# Patient Record
Sex: MALE | Race: WHITE | NOT HISPANIC OR LATINO | URBAN - METROPOLITAN AREA
[De-identification: names, ages, dates, MRNs, and addresses within clinical notes are randomized per-mention and may not be internally consistent; named-entity substitution may affect disease eponyms.]

---

## 2023-03-10 PROCEDURE — 88305 TISSUE EXAM BY PATHOLOGIST: CPT

## 2023-03-13 ENCOUNTER — LAB REQUISITION (OUTPATIENT)
Dept: LAB | Facility: HOSPITAL | Age: 63
End: 2023-03-13
Payer: COMMERCIAL

## 2023-03-13 DIAGNOSIS — M72.0 PALMAR FASCIAL FIBROMATOSIS (DUPUYTREN): ICD-10-CM

## 2023-03-14 LAB — REF LAB TEST METHOD: NORMAL

## 2023-04-11 ENCOUNTER — HOSPITAL ENCOUNTER (INPATIENT)
Facility: HOSPITAL | Age: 63
LOS: 2 days | Discharge: HOME | DRG: 246 | End: 2023-04-13
Attending: INTERNAL MEDICINE | Admitting: INTERNAL MEDICINE
Payer: COMMERCIAL

## 2023-04-11 DIAGNOSIS — F10.10 ALCOHOL ABUSE: Primary | ICD-10-CM

## 2023-04-11 PROBLEM — I21.3 STEMI (ST ELEVATION MYOCARDIAL INFARCTION): Status: ACTIVE | Noted: 2023-04-11

## 2023-04-11 PROBLEM — I21.02: Status: ACTIVE | Noted: 2023-04-11

## 2023-04-11 LAB
ACT BLD: 468 SECONDS (ref 82–152)
ALBUMIN SERPL-MCNC: 4 G/DL (ref 3.5–5.2)
ALBUMIN/GLOB SERPL: 1.7 G/DL
ALP SERPL-CCNC: 65 U/L (ref 39–117)
ALT SERPL W P-5'-P-CCNC: 61 U/L (ref 1–41)
ANION GAP SERPL CALCULATED.3IONS-SCNC: 17 MMOL/L (ref 5–15)
AST SERPL-CCNC: 109 U/L (ref 1–40)
BILIRUB SERPL-MCNC: 0.6 MG/DL (ref 0–1.2)
BUN SERPL-MCNC: 13 MG/DL (ref 8–23)
BUN/CREAT SERPL: 14.6 (ref 7–25)
CALCIUM SPEC-SCNC: 9 MG/DL (ref 8.6–10.5)
CHLORIDE SERPL-SCNC: 104 MMOL/L (ref 98–107)
CO2 SERPL-SCNC: 19 MMOL/L (ref 22–29)
CREAT BLDA-MCNC: 1.2 MG/DL (ref 0.6–1.3)
CREAT SERPL-MCNC: 0.89 MG/DL (ref 0.76–1.27)
EGFRCR SERPLBLD CKD-EPI 2021: 98.1 ML/MIN/1.73
GEN 5 2HR TROPONIN T REFLEX: 1790 NG/L
GLOBULIN UR ELPH-MCNC: 2.4 GM/DL
GLUCOSE BLDC GLUCOMTR-MCNC: 124 MG/DL (ref 70–130)
GLUCOSE SERPL-MCNC: 120 MG/DL (ref 65–99)
POTASSIUM SERPL-SCNC: 3.9 MMOL/L (ref 3.5–5.2)
PROT SERPL-MCNC: 6.4 G/DL (ref 6–8.5)
SODIUM SERPL-SCNC: 140 MMOL/L (ref 136–145)
TROPONIN T DELTA: 513 NG/L
TROPONIN T SERPL HS-MCNC: 1277 NG/L

## 2023-04-11 PROCEDURE — 82565 ASSAY OF CREATININE: CPT

## 2023-04-11 PROCEDURE — 93458 L HRT ARTERY/VENTRICLE ANGIO: CPT | Performed by: INTERNAL MEDICINE

## 2023-04-11 PROCEDURE — 25010000002 MAGNESIUM SULFATE 2 GM/50ML SOLUTION: Performed by: NURSE PRACTITIONER

## 2023-04-11 PROCEDURE — 027034Z DILATION OF CORONARY ARTERY, ONE ARTERY WITH DRUG-ELUTING INTRALUMINAL DEVICE, PERCUTANEOUS APPROACH: ICD-10-PCS | Performed by: INTERNAL MEDICINE

## 2023-04-11 PROCEDURE — 92941 PRQ TRLML REVSC TOT OCCL AMI: CPT | Performed by: INTERNAL MEDICINE

## 2023-04-11 PROCEDURE — C1887 CATHETER, GUIDING: HCPCS | Performed by: INTERNAL MEDICINE

## 2023-04-11 PROCEDURE — 25010000002 FENTANYL CITRATE (PF) 50 MCG/ML SOLUTION: Performed by: INTERNAL MEDICINE

## 2023-04-11 PROCEDURE — 4A023N7 MEASUREMENT OF CARDIAC SAMPLING AND PRESSURE, LEFT HEART, PERCUTANEOUS APPROACH: ICD-10-PCS | Performed by: INTERNAL MEDICINE

## 2023-04-11 PROCEDURE — 25010000002 AMIODARONE PER 30 MG: Performed by: INTERNAL MEDICINE

## 2023-04-11 PROCEDURE — C1725 CATH, TRANSLUMIN NON-LASER: HCPCS | Performed by: INTERNAL MEDICINE

## 2023-04-11 PROCEDURE — 80053 COMPREHEN METABOLIC PANEL: CPT | Performed by: NURSE PRACTITIONER

## 2023-04-11 PROCEDURE — 25010000002 MIDAZOLAM PER 1 MG: Performed by: INTERNAL MEDICINE

## 2023-04-11 PROCEDURE — 25010000002 EPTIFIBATIDE 20 MG/10ML SOLUTION: Performed by: INTERNAL MEDICINE

## 2023-04-11 PROCEDURE — C1769 GUIDE WIRE: HCPCS | Performed by: INTERNAL MEDICINE

## 2023-04-11 PROCEDURE — 99232 SBSQ HOSP IP/OBS MODERATE 35: CPT | Performed by: INTERNAL MEDICINE

## 2023-04-11 PROCEDURE — 82962 GLUCOSE BLOOD TEST: CPT

## 2023-04-11 PROCEDURE — 85347 COAGULATION TIME ACTIVATED: CPT

## 2023-04-11 PROCEDURE — 25510000001 IOPAMIDOL PER 1 ML: Performed by: INTERNAL MEDICINE

## 2023-04-11 PROCEDURE — 25010000002 THIAMINE PER 100 MG: Performed by: NURSE PRACTITIONER

## 2023-04-11 PROCEDURE — C1874 STENT, COATED/COV W/DEL SYS: HCPCS | Performed by: INTERNAL MEDICINE

## 2023-04-11 PROCEDURE — 5A12012 PERFORMANCE OF CARDIAC OUTPUT, SINGLE, MANUAL: ICD-10-PCS | Performed by: INTERNAL MEDICINE

## 2023-04-11 PROCEDURE — 25010000002 HEPARIN (PORCINE) PER 1000 UNITS: Performed by: INTERNAL MEDICINE

## 2023-04-11 PROCEDURE — 99223 1ST HOSP IP/OBS HIGH 75: CPT | Performed by: INTERNAL MEDICINE

## 2023-04-11 PROCEDURE — 5A2204Z RESTORATION OF CARDIAC RHYTHM, SINGLE: ICD-10-PCS | Performed by: INTERNAL MEDICINE

## 2023-04-11 PROCEDURE — B2151ZZ FLUOROSCOPY OF LEFT HEART USING LOW OSMOLAR CONTRAST: ICD-10-PCS | Performed by: INTERNAL MEDICINE

## 2023-04-11 PROCEDURE — C1894 INTRO/SHEATH, NON-LASER: HCPCS | Performed by: INTERNAL MEDICINE

## 2023-04-11 PROCEDURE — 84484 ASSAY OF TROPONIN QUANT: CPT | Performed by: INTERNAL MEDICINE

## 2023-04-11 PROCEDURE — B2111ZZ FLUOROSCOPY OF MULTIPLE CORONARY ARTERIES USING LOW OSMOLAR CONTRAST: ICD-10-PCS | Performed by: INTERNAL MEDICINE

## 2023-04-11 PROCEDURE — C9606 PERC D-E COR REVASC W AMI S: HCPCS | Performed by: INTERNAL MEDICINE

## 2023-04-11 DEVICE — XIENCE SKYPOINT™ EVEROLIMUS ELUTING CORONARY STENT SYSTEM 3.00 MM X 15 MM / RAPID-EXCHANGE
Type: IMPLANTABLE DEVICE | Status: FUNCTIONAL
Brand: XIENCE SKYPOINT™

## 2023-04-11 RX ORDER — ALPRAZOLAM 0.25 MG/1
0.25 TABLET ORAL 3 TIMES DAILY PRN
Status: DISCONTINUED | OUTPATIENT
Start: 2023-04-11 | End: 2023-04-11

## 2023-04-11 RX ORDER — LORAZEPAM 2 MG/ML
2 INJECTION INTRAMUSCULAR
Status: DISCONTINUED | OUTPATIENT
Start: 2023-04-11 | End: 2023-04-13 | Stop reason: HOSPADM

## 2023-04-11 RX ORDER — ASPIRIN 81 MG/1
81 TABLET ORAL DAILY
Status: DISCONTINUED | OUTPATIENT
Start: 2023-04-11 | End: 2023-04-13 | Stop reason: HOSPADM

## 2023-04-11 RX ORDER — LORAZEPAM 1 MG/1
1 TABLET ORAL EVERY 8 HOURS
Status: DISCONTINUED | OUTPATIENT
Start: 2023-04-12 | End: 2023-04-11

## 2023-04-11 RX ORDER — ACETAMINOPHEN 325 MG/1
650 TABLET ORAL EVERY 4 HOURS PRN
Status: DISCONTINUED | OUTPATIENT
Start: 2023-04-11 | End: 2023-04-13 | Stop reason: HOSPADM

## 2023-04-11 RX ORDER — SODIUM CHLORIDE 9 MG/ML
250 INJECTION, SOLUTION INTRAVENOUS ONCE AS NEEDED
Status: DISCONTINUED | OUTPATIENT
Start: 2023-04-11 | End: 2023-04-13 | Stop reason: HOSPADM

## 2023-04-11 RX ORDER — CLOPIDOGREL BISULFATE 75 MG/1
600 TABLET ORAL ONCE
Status: DISCONTINUED | OUTPATIENT
Start: 2023-04-11 | End: 2023-04-11 | Stop reason: SDUPTHER

## 2023-04-11 RX ORDER — ROSUVASTATIN CALCIUM 20 MG/1
20 TABLET, COATED ORAL NIGHTLY
Status: DISCONTINUED | OUTPATIENT
Start: 2023-04-11 | End: 2023-04-13 | Stop reason: HOSPADM

## 2023-04-11 RX ORDER — THIAMINE HYDROCHLORIDE 100 MG/ML
100 INJECTION, SOLUTION INTRAMUSCULAR; INTRAVENOUS ONCE
Status: COMPLETED | OUTPATIENT
Start: 2023-04-11 | End: 2023-04-11

## 2023-04-11 RX ORDER — LORAZEPAM 1 MG/1
4 TABLET ORAL
Status: DISCONTINUED | OUTPATIENT
Start: 2023-04-11 | End: 2023-04-13 | Stop reason: HOSPADM

## 2023-04-11 RX ORDER — LIDOCAINE HYDROCHLORIDE 10 MG/ML
10 INJECTION, SOLUTION EPIDURAL; INFILTRATION; INTRACAUDAL; PERINEURAL ONCE
Status: COMPLETED | OUTPATIENT
Start: 2023-04-11 | End: 2023-04-11

## 2023-04-11 RX ORDER — MORPHINE SULFATE 2 MG/ML
1 INJECTION, SOLUTION INTRAMUSCULAR; INTRAVENOUS EVERY 4 HOURS PRN
Status: DISCONTINUED | OUTPATIENT
Start: 2023-04-11 | End: 2023-04-13 | Stop reason: HOSPADM

## 2023-04-11 RX ORDER — LORAZEPAM 1 MG/1
1 TABLET ORAL EVERY 6 HOURS
Status: DISCONTINUED | OUTPATIENT
Start: 2023-04-11 | End: 2023-04-11

## 2023-04-11 RX ORDER — LORAZEPAM 2 MG/ML
4 INJECTION INTRAMUSCULAR
Status: DISCONTINUED | OUTPATIENT
Start: 2023-04-11 | End: 2023-04-13 | Stop reason: HOSPADM

## 2023-04-11 RX ORDER — LORAZEPAM 2 MG/ML
1 INJECTION INTRAMUSCULAR
Status: DISCONTINUED | OUTPATIENT
Start: 2023-04-11 | End: 2023-04-13 | Stop reason: HOSPADM

## 2023-04-11 RX ORDER — LORAZEPAM 1 MG/1
2 TABLET ORAL
Status: DISCONTINUED | OUTPATIENT
Start: 2023-04-11 | End: 2023-04-13 | Stop reason: HOSPADM

## 2023-04-11 RX ORDER — LORAZEPAM 1 MG/1
1 TABLET ORAL EVERY 8 HOURS
Status: DISCONTINUED | OUTPATIENT
Start: 2023-04-13 | End: 2023-04-12

## 2023-04-11 RX ORDER — FOLIC ACID 1 MG/1
1 TABLET ORAL DAILY
Status: DISCONTINUED | OUTPATIENT
Start: 2023-04-12 | End: 2023-04-13 | Stop reason: HOSPADM

## 2023-04-11 RX ORDER — SODIUM CHLORIDE 9 MG/ML
75 INJECTION, SOLUTION INTRAVENOUS CONTINUOUS
Status: ACTIVE | OUTPATIENT
Start: 2023-04-11 | End: 2023-04-12

## 2023-04-11 RX ORDER — LIDOCAINE HYDROCHLORIDE 10 MG/ML
INJECTION, SOLUTION EPIDURAL; INFILTRATION; INTRACAUDAL; PERINEURAL
Status: DISCONTINUED | OUTPATIENT
Start: 2023-04-11 | End: 2023-04-11 | Stop reason: HOSPADM

## 2023-04-11 RX ORDER — EPTIFIBATIDE 20 MG/10ML
INJECTION INTRAVENOUS
Status: DISCONTINUED | OUTPATIENT
Start: 2023-04-11 | End: 2023-04-11 | Stop reason: HOSPADM

## 2023-04-11 RX ORDER — HEPARIN SODIUM 1000 [USP'U]/ML
INJECTION, SOLUTION INTRAVENOUS; SUBCUTANEOUS
Status: DISCONTINUED | OUTPATIENT
Start: 2023-04-11 | End: 2023-04-11 | Stop reason: HOSPADM

## 2023-04-11 RX ORDER — LORAZEPAM 1 MG/1
1 TABLET ORAL EVERY 6 HOURS
Status: DISCONTINUED | OUTPATIENT
Start: 2023-04-12 | End: 2023-04-12

## 2023-04-11 RX ORDER — AMIODARONE HYDROCHLORIDE 50 MG/ML
INJECTION, SOLUTION INTRAVENOUS
Status: DISCONTINUED | OUTPATIENT
Start: 2023-04-11 | End: 2023-04-11 | Stop reason: HOSPADM

## 2023-04-11 RX ORDER — FENTANYL CITRATE 50 UG/ML
INJECTION, SOLUTION INTRAMUSCULAR; INTRAVENOUS
Status: DISCONTINUED | OUTPATIENT
Start: 2023-04-11 | End: 2023-04-11 | Stop reason: HOSPADM

## 2023-04-11 RX ORDER — DIPHENOXYLATE HYDROCHLORIDE AND ATROPINE SULFATE 2.5; .025 MG/1; MG/1
1 TABLET ORAL DAILY
Status: DISCONTINUED | OUTPATIENT
Start: 2023-04-12 | End: 2023-04-13 | Stop reason: HOSPADM

## 2023-04-11 RX ORDER — ONDANSETRON 2 MG/ML
4 INJECTION INTRAMUSCULAR; INTRAVENOUS EVERY 6 HOURS PRN
Status: DISCONTINUED | OUTPATIENT
Start: 2023-04-11 | End: 2023-04-13 | Stop reason: HOSPADM

## 2023-04-11 RX ORDER — ONDANSETRON 4 MG/1
4 TABLET, FILM COATED ORAL EVERY 6 HOURS PRN
Status: DISCONTINUED | OUTPATIENT
Start: 2023-04-11 | End: 2023-04-13 | Stop reason: HOSPADM

## 2023-04-11 RX ORDER — CLOPIDOGREL BISULFATE 75 MG/1
TABLET ORAL
Status: DISCONTINUED | OUTPATIENT
Start: 2023-04-11 | End: 2023-04-13 | Stop reason: HOSPADM

## 2023-04-11 RX ORDER — HYDROCODONE BITARTRATE AND ACETAMINOPHEN 7.5; 325 MG/1; MG/1
1 TABLET ORAL EVERY 4 HOURS PRN
Status: DISCONTINUED | OUTPATIENT
Start: 2023-04-11 | End: 2023-04-13 | Stop reason: HOSPADM

## 2023-04-11 RX ORDER — MIDAZOLAM HYDROCHLORIDE 1 MG/ML
INJECTION INTRAMUSCULAR; INTRAVENOUS
Status: DISCONTINUED | OUTPATIENT
Start: 2023-04-11 | End: 2023-04-11 | Stop reason: HOSPADM

## 2023-04-11 RX ORDER — SODIUM CHLORIDE 9 MG/ML
INJECTION, SOLUTION INTRAVENOUS
Status: COMPLETED | OUTPATIENT
Start: 2023-04-11 | End: 2023-04-11

## 2023-04-11 RX ORDER — NALOXONE HCL 0.4 MG/ML
0.4 VIAL (ML) INJECTION
Status: DISCONTINUED | OUTPATIENT
Start: 2023-04-11 | End: 2023-04-13 | Stop reason: HOSPADM

## 2023-04-11 RX ORDER — MAGNESIUM SULFATE HEPTAHYDRATE 40 MG/ML
2 INJECTION, SOLUTION INTRAVENOUS ONCE
Status: COMPLETED | OUTPATIENT
Start: 2023-04-11 | End: 2023-04-11

## 2023-04-11 RX ORDER — LORAZEPAM 1 MG/1
1 TABLET ORAL
Status: DISCONTINUED | OUTPATIENT
Start: 2023-04-11 | End: 2023-04-13 | Stop reason: HOSPADM

## 2023-04-11 RX ORDER — BISOPROLOL FUMARATE 5 MG/1
2.5 TABLET, FILM COATED ORAL
Status: DISCONTINUED | OUTPATIENT
Start: 2023-04-11 | End: 2023-04-12

## 2023-04-11 RX ORDER — CLOPIDOGREL BISULFATE 75 MG/1
75 TABLET ORAL DAILY
Status: DISCONTINUED | OUTPATIENT
Start: 2023-04-12 | End: 2023-04-13 | Stop reason: HOSPADM

## 2023-04-11 RX ADMIN — LIDOCAINE HYDROCHLORIDE 10 ML: 10 INJECTION, SOLUTION EPIDURAL; INFILTRATION; INTRACAUDAL; PERINEURAL at 21:55

## 2023-04-11 RX ADMIN — THIAMINE HYDROCHLORIDE 100 MG: 100 INJECTION, SOLUTION INTRAMUSCULAR; INTRAVENOUS at 20:42

## 2023-04-11 RX ADMIN — SODIUM CHLORIDE 75 ML/HR: 9 INJECTION, SOLUTION INTRAVENOUS at 21:11

## 2023-04-11 RX ADMIN — ROSUVASTATIN 20 MG: 20 TABLET, FILM COATED ORAL at 20:42

## 2023-04-11 RX ADMIN — MAGNESIUM SULFATE HEPTAHYDRATE 2 G: 2 INJECTION, SOLUTION INTRAVENOUS at 20:41

## 2023-04-11 NOTE — Clinical Note
Right femoral access site is without redness, swelling, and/or hematoma. DP and PT pulses are palpable at 2+. Patient denies pain at this time.

## 2023-04-11 NOTE — Clinical Note
Suture was used to secure the sheath post procedure. Transparent Dressing was used to secure the sheath post procedure.  Sheath Left Intact after the procedure.  Pressure Bag was used to stabalize the sheath post procedure.

## 2023-04-11 NOTE — PROGRESS NOTES
Intensive Care Follow-up Note     STEMI (ST elevation myocardial infarction)    History of Present Illness     Mr. Medellin is a 63 yo male with PMH psoriasis, HTN and ETOH who presents to the ICU as a transfer from the CATH LAB s/p [facility] with Dr. Francis. Patient apparently had an uneventful morning. This afternoon, around 1600, he was sitting up looking at how to fix an  on the Internet when he developed sudden clamminess and neck/chest pain. EMS was called and he was was emergently taken to the CATH LAB. During procedure, a thrombotic lesion was found in the LAD so he underwent PCA. According to reports, while on the cath lab table he had a VFib arrest for which he received 1 round of shocking. Following shock he was in NSR. Due to his event, he was brought to the ICU for management.     On arrival he is alert and oriented, discussing the events of the day with his wife. He does note he drinks 4 shots of Vodka nightly so he is concerned about withdrawing. He notes in the past when he has stopped drinking he has had night sweats but never seized.     I spent 8 minutes of time on this.  Lexie Carlin, CATHERINE, AGACNP-BC, APRN    Electronically signed by MARCELLE Barry, 04/11/23, 5:49 PM EDT.    The above information has been reviewed and I have made modifications as necessary to reflect my findings during my interview with the patient and his wife as well as the exam.  The patient was seen in the intensive care unit.  He states that he is breathing without difficulty.  The chest discomfort from earlier has resolved.  He does note that the mid sternal area is tender to palpation and when flexing his muscles it does tend to feel bit worse as well is with very deep breaths.  He denies any injuries to that area.    Problem List, Surgical History, Family, Social History, and ROS     STEMI (ST elevation myocardial infarction)    Acute ST elevation myocardial infarction (STEMI) due to occlusion of mid portion  "of left anterior descending (LAD) coronary artery    No past surgical history on file.    No Known Allergies  No current facility-administered medications on file prior to encounter.     Current Outpatient Medications on File Prior to Encounter   Medication Sig   • HYDROcodone-acetaminophen (NORCO) 5-325 MG per tablet Take 1 tablet by mouth every 6 hours as needed for pain   • ondansetron ODT (ZOFRAN-ODT) 8 MG disintegrating tablet Place 1 tablet on the tongue every 8 hours as needed     MEDICATION LIST AND ALLERGIES REVIEWED.    No family history on file.     FAMILY AND SOCIAL HISTORY REVIEWED.    Review of Systems  A full review of systems has been completed and it is negative except as mentioned expressly in the HPI.      Physical Exam and Clinical Information   /82   Pulse 78   Temp 98.5 °F (36.9 °C) (Oral)   Resp 16   Ht 182.9 cm (72\")   Wt 86.2 kg (190 lb)   SpO2 96%   BMI 25.77 kg/m²   Physical Exam  Vitals reviewed.   Constitutional:       Appearance: Normal appearance. He is normal weight. He is not ill-appearing, toxic-appearing or diaphoretic.   HENT:      Head: Normocephalic.      Nose: Nose normal. No congestion.      Mouth/Throat:      Mouth: Mucous membranes are moist.      Pharynx: No oropharyngeal exudate.   Eyes:      Extraocular Movements: Extraocular movements intact.      Pupils: Pupils are equal, round, and reactive to light.   Cardiovascular:      Rate and Rhythm: Normal rate and regular rhythm.      Pulses: Normal pulses.      Heart sounds: Normal heart sounds. No murmur heard.  Pulmonary:      Effort: Pulmonary effort is normal. No respiratory distress.      Breath sounds: Normal breath sounds. No wheezing.      Comments: The mid sternum is a bit tender to palpation.  There is a slight bony protuberance at that area that is the center of the tenderness.  No bruising is noted.  No dislocated ribs are palpated.  Chest:      Chest wall: Tenderness present.   Abdominal:      " General: Abdomen is flat. Bowel sounds are normal. There is no distension.      Tenderness: There is no abdominal tenderness.   Musculoskeletal:      Cervical back: Normal range of motion and neck supple. No rigidity or tenderness.      Comments: Right femoral introducer   Lymphadenopathy:      Cervical: No cervical adenopathy.   Skin:     General: Skin is warm.      Capillary Refill: Capillary refill takes less than 2 seconds.      Coloration: Skin is not jaundiced.      Findings: No bruising, erythema or lesion.   Neurological:      General: No focal deficit present.      Mental Status: He is alert and oriented to person, place, and time.   Psychiatric:         Mood and Affect: Mood normal.                  Impression     Acute ST elevation myocardial infarction (STEMI) due to occlusion of mid portion of left anterior descending (LAD) coronary artery  History of alcohol abuse with prior withdrawal      Plan/Recommendations     The patient be monitored closely in the intensive care unit.  Post-cath orders per Dr. Francis.  I will go ahead and check a series of labs as well as a chest x-ray.  I suspect that his sternal pain is musculoskeletal and not related to his coronary disease.  We will watch this for any worsening.  He does have a slight bony protuberance that appears to be the most tender area that he states has not been there before.  It is unclear what to make of this although it may just be a bit swollen.  He has not had any trauma to that area.  We will watch for any signs of acute alcohol withdrawal.  Orders for benzodiazepines have been placed.    Guru Delgado MD, [facility]  Pulmonary and Critical Care Medicine  04/11/23 20:15 EDT         CC: Provider, No Known    Electronically signed by MARCELLE Barry, 04/11/23, 5:49 PM EDT.

## 2023-04-11 NOTE — H&P
"[facility] Cardiology Consult Note      Referring Provider: No ref. provider found  Primary Provider:  Provider, No Known        Chief complaint: Chest pain    Identification: 62-year-old white male    Problem list:    1. Coronary artery disease  2. Unknown cholesterol status  3. Psoriasis    Allergies:  Patient has no known allergies.    Home/Current Medications:      Stelara 45 mg every 3 months    History of present illness: Onset of chest pain approximately 1 hour prior to arrival.  Brought emergently to the Cath Lab with anterior ST elevation.    Cardiac Risk Factors: Negative for diabetes, tobacco use and family history of coronary disease.  His cholesterol status is unknown.  He states recently his blood pressures been a little high.    Social History: .  No children.  Retired.    Family History: No history of coronary disease.    Review of Systems  Pertinent positives are listed in the HPI.  All other systems reviewed are negative.         Objective     Vital Sign Min/Max for last 24 hours  No data recorded   BP  Min: 124/82  Max: 168/99   Pulse  Min: 77  Max: 87   Resp  Min: 16  Max: 19   SpO2  Min: 94 %  Max: 95 %   No data recorded   Weight  Min: 86.2 kg (190 lb)  Max: 86.2 kg (190 lb)     Flowsheet Rows    Flowsheet Row First Filed Value   Admission Height 182.9 cm (72\") Documented at 04/11/2023 1600   Admission Weight 86.2 kg (190 lb) Documented at 04/11/2023 1600          Physical Exam:    General: Well-developed well-nourished white male.  No acute distress.  HEENT: Sclera anicteric.  Oropharynx clear.  CV: Regular rate and rhythm.  No murmur gallop or rub heard.  2+ pedal pulses.  Resp: Clear bilaterally.  GI: Soft, nontender  : deferred  Musculoskeletal: No gross joint deformities are noted  Skin: Warm and dry  Neurologic: Nonfocal  Psychiatric: Appropriate mood and affect     EKG: ST elevation in leads V2, V3 V4    Echo: Pending    Labs:          Invalid input(s): " LABALBU, PROT  @LABRCNTIP(wbc:3,hgb:3,hct:3,PLT:3,NEUTOPHILPCT:3;LYMPHOPCT:3,MONOPCT  )  Lab Results (last 24 hours)     ** No results found for the last 24 hours. **        No results found for: TROPONINI, TROPONINT   No results found for: CHOL  No results found for: HDL  No results found for: LDLDIRECT  No results found for: TRIG  No components found for: CHOLHDL  No results found for: INR, PROTIME    Ejection Fraction:      Results Review:  I reviewed the patient's new clinical results.      Assessment:  1. Coronary artery disease presenting with an acute anterior ST elevation MI      Plan:    Left heart catheterization.  The patient understands the risks and benefits and agrees to proceed.    I discussed the patient's findings and my recommendations with patient.    Magy Francis MD  04/11/23  17:35 EDT

## 2023-04-12 ENCOUNTER — APPOINTMENT (OUTPATIENT)
Dept: GENERAL RADIOLOGY | Facility: HOSPITAL | Age: 63
DRG: 246 | End: 2023-04-12
Payer: COMMERCIAL

## 2023-04-12 ENCOUNTER — APPOINTMENT (OUTPATIENT)
Dept: CARDIOLOGY | Facility: HOSPITAL | Age: 63
DRG: 246 | End: 2023-04-12
Payer: COMMERCIAL

## 2023-04-12 ENCOUNTER — TRANSCRIBE ORDERS (OUTPATIENT)
Dept: CARDIAC REHAB | Facility: HOSPITAL | Age: 63
End: 2023-04-12
Payer: COMMERCIAL

## 2023-04-12 DIAGNOSIS — I21.3 ST ELEVATION MYOCARDIAL INFARCTION (STEMI), UNSPECIFIED ARTERY: Primary | ICD-10-CM

## 2023-04-12 PROBLEM — I49.01 VENTRICULAR FIBRILLATION: Status: ACTIVE | Noted: 2023-04-12

## 2023-04-12 LAB
ANION GAP SERPL CALCULATED.3IONS-SCNC: 12 MMOL/L (ref 5–15)
BH CV ECHO MEAS - AO MAX PG: 4.7 MMHG
BH CV ECHO MEAS - AO MEAN PG: 3 MMHG
BH CV ECHO MEAS - AO ROOT DIAM: 3.7 CM
BH CV ECHO MEAS - AO V2 MAX: 108 CM/SEC
BH CV ECHO MEAS - AO V2 VTI: 20.1 CM
BH CV ECHO MEAS - EDV(CUBED): 68.9 ML
BH CV ECHO MEAS - EDV(MOD-SP2): 76.9 ML
BH CV ECHO MEAS - EDV(MOD-SP4): 89.5 ML
BH CV ECHO MEAS - EF(MOD-BP): 45.1 %
BH CV ECHO MEAS - EF(MOD-SP2): 44.2 %
BH CV ECHO MEAS - EF(MOD-SP4): 45 %
BH CV ECHO MEAS - ESV(CUBED): 24.4 ML
BH CV ECHO MEAS - ESV(MOD-SP2): 42.9 ML
BH CV ECHO MEAS - ESV(MOD-SP4): 49.2 ML
BH CV ECHO MEAS - FS: 29.3 %
BH CV ECHO MEAS - IVS/LVPW: 1.22 CM
BH CV ECHO MEAS - IVSD: 1 CM
BH CV ECHO MEAS - LA DIMENSION: 3 CM
BH CV ECHO MEAS - LAT PEAK E' VEL: 5.8 CM/SEC
BH CV ECHO MEAS - LV MASS(C)D: 132.1 GRAMS
BH CV ECHO MEAS - LV MAX PG: 4.3 MMHG
BH CV ECHO MEAS - LV MEAN PG: 2 MMHG
BH CV ECHO MEAS - LV V1 MAX: 104 CM/SEC
BH CV ECHO MEAS - LV V1 VTI: 18.8 CM
BH CV ECHO MEAS - LVIDD: 4.1 CM
BH CV ECHO MEAS - LVIDS: 2.9 CM
BH CV ECHO MEAS - LVPWD: 0.9 CM
BH CV ECHO MEAS - MED PEAK E' VEL: 5.6 CM/SEC
BH CV ECHO MEAS - MV A MAX VEL: 87.5 CM/SEC
BH CV ECHO MEAS - MV DEC SLOPE: 247 CM/SEC2
BH CV ECHO MEAS - MV DEC TIME: 0.21 MSEC
BH CV ECHO MEAS - MV E MAX VEL: 68.1 CM/SEC
BH CV ECHO MEAS - MV E/A: 0.78
BH CV ECHO MEAS - MV MAX PG: 4.5 MMHG
BH CV ECHO MEAS - MV MEAN PG: 2 MMHG
BH CV ECHO MEAS - MV P1/2T: 84 MSEC
BH CV ECHO MEAS - MV V2 VTI: 27.6 CM
BH CV ECHO MEAS - MVA(P1/2T): 2.6 CM2
BH CV ECHO MEAS - PA ACC TIME: 0.16 SEC
BH CV ECHO MEAS - PA PR(ACCEL): 7.9 MMHG
BH CV ECHO MEAS - SV(MOD-SP2): 34 ML
BH CV ECHO MEAS - SV(MOD-SP4): 40.3 ML
BH CV ECHO MEAS - TAPSE (>1.6): 2.09 CM
BH CV ECHO MEASUREMENTS AVERAGE E/E' RATIO: 11.95
BH CV XLRA - RV BASE: 3.3 CM
BH CV XLRA - RV LENGTH: 7.3 CM
BH CV XLRA - RV MID: 2 CM
BH CV XLRA - TDI S': 10.2 CM/SEC
BUN SERPL-MCNC: 12 MG/DL (ref 8–23)
BUN/CREAT SERPL: 15 (ref 7–25)
CALCIUM SPEC-SCNC: 8.8 MG/DL (ref 8.6–10.5)
CHLORIDE SERPL-SCNC: 106 MMOL/L (ref 98–107)
CHOLEST SERPL-MCNC: 267 MG/DL (ref 0–200)
CO2 SERPL-SCNC: 20 MMOL/L (ref 22–29)
CREAT SERPL-MCNC: 0.8 MG/DL (ref 0.76–1.27)
DEPRECATED RDW RBC AUTO: 45.7 FL (ref 37–54)
EGFRCR SERPLBLD CKD-EPI 2021: 101.3 ML/MIN/1.73
ERYTHROCYTE [DISTWIDTH] IN BLOOD BY AUTOMATED COUNT: 12.7 % (ref 12.3–15.4)
GLUCOSE SERPL-MCNC: 124 MG/DL (ref 65–99)
HBA1C MFR BLD: 5.8 % (ref 4.8–5.6)
HCT VFR BLD AUTO: 43.5 % (ref 37.5–51)
HDLC SERPL-MCNC: 60 MG/DL (ref 40–60)
HGB BLD-MCNC: 15.2 G/DL (ref 13–17.7)
LDLC SERPL CALC-MCNC: 177 MG/DL (ref 0–100)
LDLC/HDLC SERPL: 2.9 {RATIO}
LEFT ATRIUM VOLUME INDEX: 12 ML/M2
LV EF 2D ECHO EST: 45 %
MAGNESIUM SERPL-MCNC: 2.4 MG/DL (ref 1.6–2.4)
MAXIMAL PREDICTED HEART RATE: 160 BPM
MCH RBC QN AUTO: 34.2 PG (ref 26.6–33)
MCHC RBC AUTO-ENTMCNC: 34.9 G/DL (ref 31.5–35.7)
MCV RBC AUTO: 97.8 FL (ref 79–97)
PHOSPHATE SERPL-MCNC: 3.1 MG/DL (ref 2.5–4.5)
PLATELET # BLD AUTO: 111 10*3/MM3 (ref 140–450)
PMV BLD AUTO: 11.3 FL (ref 6–12)
POTASSIUM SERPL-SCNC: 4.2 MMOL/L (ref 3.5–5.2)
QT INTERVAL: 418 MS
QTC INTERVAL: 476 MS
RBC # BLD AUTO: 4.45 10*6/MM3 (ref 4.14–5.8)
SODIUM SERPL-SCNC: 138 MMOL/L (ref 136–145)
STRESS TARGET HR: 136 BPM
TRIGL SERPL-MCNC: 164 MG/DL (ref 0–150)
VLDLC SERPL-MCNC: 30 MG/DL (ref 5–40)
WBC NRBC COR # BLD: 7.36 10*3/MM3 (ref 3.4–10.8)

## 2023-04-12 PROCEDURE — 84100 ASSAY OF PHOSPHORUS: CPT | Performed by: NURSE PRACTITIONER

## 2023-04-12 PROCEDURE — 99232 SBSQ HOSP IP/OBS MODERATE 35: CPT | Performed by: INTERNAL MEDICINE

## 2023-04-12 PROCEDURE — 80048 BASIC METABOLIC PNL TOTAL CA: CPT | Performed by: INTERNAL MEDICINE

## 2023-04-12 PROCEDURE — 80061 LIPID PANEL: CPT | Performed by: INTERNAL MEDICINE

## 2023-04-12 PROCEDURE — 71045 X-RAY EXAM CHEST 1 VIEW: CPT

## 2023-04-12 PROCEDURE — 83735 ASSAY OF MAGNESIUM: CPT | Performed by: NURSE PRACTITIONER

## 2023-04-12 PROCEDURE — 83036 HEMOGLOBIN GLYCOSYLATED A1C: CPT | Performed by: INTERNAL MEDICINE

## 2023-04-12 PROCEDURE — 85027 COMPLETE CBC AUTOMATED: CPT | Performed by: INTERNAL MEDICINE

## 2023-04-12 PROCEDURE — 93306 TTE W/DOPPLER COMPLETE: CPT

## 2023-04-12 PROCEDURE — 93005 ELECTROCARDIOGRAM TRACING: CPT | Performed by: INTERNAL MEDICINE

## 2023-04-12 RX ORDER — BISOPROLOL FUMARATE 5 MG/1
2.5 TABLET, FILM COATED ORAL 2 TIMES DAILY
Status: DISCONTINUED | OUTPATIENT
Start: 2023-04-12 | End: 2023-04-13

## 2023-04-12 RX ORDER — NAPROXEN 250 MG/1
250 TABLET ORAL 2 TIMES DAILY WITH MEALS
Status: DISCONTINUED | OUTPATIENT
Start: 2023-04-12 | End: 2023-04-13 | Stop reason: HOSPADM

## 2023-04-12 RX ORDER — INDOMETHACIN 50 MG/1
50 CAPSULE ORAL
COMMUNITY
End: 2023-04-13 | Stop reason: HOSPADM

## 2023-04-12 RX ORDER — USTEKINUMAB 45 MG/.5ML
45 INJECTION, SOLUTION SUBCUTANEOUS
COMMUNITY

## 2023-04-12 RX ADMIN — FOLIC ACID 1 MG: 1 TABLET ORAL at 08:12

## 2023-04-12 RX ADMIN — BISOPROLOL FUMARATE 2.5 MG: 5 TABLET, FILM COATED ORAL at 18:20

## 2023-04-12 RX ADMIN — ROSUVASTATIN 20 MG: 20 TABLET, FILM COATED ORAL at 21:07

## 2023-04-12 RX ADMIN — BISOPROLOL FUMARATE 2.5 MG: 5 TABLET, FILM COATED ORAL at 08:11

## 2023-04-12 RX ADMIN — LORAZEPAM 1 MG: 1 TABLET ORAL at 06:00

## 2023-04-12 RX ADMIN — NAPROXEN 250 MG: 250 TABLET ORAL at 09:51

## 2023-04-12 RX ADMIN — NAPROXEN 250 MG: 250 TABLET ORAL at 18:21

## 2023-04-12 RX ADMIN — CLOPIDOGREL BISULFATE 75 MG: 75 TABLET ORAL at 08:11

## 2023-04-12 RX ADMIN — ASPIRIN 81 MG: 81 TABLET, COATED ORAL at 08:11

## 2023-04-12 RX ADMIN — MULTIVITAMIN TABLET 1 TABLET: TABLET at 08:12

## 2023-04-12 RX ADMIN — THIAMINE HCL TAB 100 MG 100 MG: 100 TAB at 08:11

## 2023-04-12 NOTE — PLAN OF CARE
Goal Outcome Evaluation:  Plan of Care Reviewed With: patient           Outcome Evaluation: Pt denied cardiac chest pain for the remainder of the evening.  Does complain of tenderness midsternum that he reports originates from a protruberance that he reports is new this evening.  It is painful on palpation.  Portable chest X Ray obtained.  Vitals stable throughout the night with pressures mostly between 120-145.  Femoral Arterial sheath removed without complication.  Pt has reported very mild, fleeting anxiety otherwise no symptoms of alcohol withdrawal.  Will continue to closely monitor

## 2023-04-12 NOTE — NURSING NOTE
Pt. Referred for Phase II Cardiac Rehab. Staff discussed benefits of exercise, program protocol, and educational material provided. Teach back verified.  Patient scheduled for orientation at [facility] on April 25, 2023 at 1300.

## 2023-04-12 NOTE — PROGRESS NOTES
Intensive Care Follow-up     Hospital:  LOS: 1 day   Mr. Santosh Medellin, 62 y.o. male is followed for:   Acute ST elevation myocardial infarction (STEMI) due to occlusion of mid portion of left anterior descending (LAD) coronary artery            History of present illness:   Mr. Medellin is a 61 yo male with PMH psoriasis, HTN and ETOH who presents to the ICU as a transfer from the CATH LAB s/p [facility] with Dr. Francis. Patient apparently had an uneventful morning. This afternoon, around 1600, he was sitting up looking at how to fix an  on the Internet when he developed sudden clamminess and neck/chest pain. EMS was called and he was was emergently taken to the CATH LAB. During procedure, a thrombotic lesion was found in the LAD so he underwent PCA. According to reports, while on the cath lab table he had a VFib arrest for which he received 1 round of shocking. Following shock he was in NSR. Due to his event, he was brought to the ICU for management.     On arrival he is alert and oriented, discussing the events of the day with his wife. He does note he drinks 4 shots of Vodka nightly so he is concerned about withdrawing. He notes in the past when he has stopped drinking he has had night sweats but never seized.       Subjective   Interval History:  Patient stable this morning.  Some mild chest pain in the sternum very tender to palpation.  Hemodynamically stable and afebrile.             The patient's past medical, surgical and social history were reviewed and updated in Epic as appropriate.       Objective     Infusions:     Medications:  aspirin, 81 mg, Oral, Daily  bisoprolol, 2.5 mg, Oral, Q24H  clopidogrel, 75 mg, Oral, Daily  thiamine, 100 mg, Oral, Daily   And  multivitamin, 1 tablet, Oral, Daily   And  folic acid, 1 mg, Oral, Daily  naproxen, 250 mg, Oral, BID With Meals  pharmacy consult - MTM, , Does not apply, Daily  rosuvastatin, 20 mg, Oral, Nightly      I reviewed the patient's  medications.    Vital Sign Min/Max for last 24 hours  Temp  Min: 98.1 °F (36.7 °C)  Max: 99.1 °F (37.3 °C)   BP  Min: 99/73  Max: 168/99   Pulse  Min: 61  Max: 101   Resp  Min: 16  Max: 19   SpO2  Min: 94 %  Max: 99 %   No data recorded       Input/Output for last 24 hour shift  04/11 0701 - 04/12 0700  In: 425 [I.V.:425]  Out: 1300 [Urine:1300]      GENERAL : NAD, conversant  RESPIRATORY/THORAX : normal respiratory effort and no intercostal retractions, CTAB  CARDIOVASCULAR : Normal S1/S2, RRR. no lower ext edema.  GASTROINTESTINAL : Soft, NT/ND. BS x 4 normoactive. No hepatosplenomegaly.  MUSCULOSKELETAL : No cyanosis, clubbing, or ischemia  NEUROLOGICAL: alert and oriented to person, place and time  PSYCHOLOGICAL : Appropriate affect      Results from last 7 days   Lab Units 04/12/23  0556   WBC 10*3/mm3 7.36   HEMOGLOBIN g/dL 15.2   PLATELETS 10*3/mm3 111*     Results from last 7 days   Lab Units 04/12/23  0556 04/11/23 2015 04/11/23  1655   SODIUM mmol/L 138 140  --    POTASSIUM mmol/L 4.2 3.9  --    CO2 mmol/L 20.0* 19.0*  --    BUN mg/dL 12 13  --    CREATININE mg/dL 0.80 0.89 1.20   MAGNESIUM mg/dL 2.4  --   --    PHOSPHORUS mg/dL 3.1  --   --    GLUCOSE mg/dL 124* 120*  --      Estimated Creatinine Clearance: 117.5 mL/min (by C-G formula based on SCr of 0.8 mg/dL).          I reviewed the patient's new clinical results.  I reviewed the patient's new imaging results/reports including actual images and agree with reports.       Imaging Results (Last 24 Hours)     Procedure Component Value Units Date/Time    XR Chest 1 View [[id number]] Collected: 04/12/23 0804     Updated: 04/12/23 0815    Narrative:      XR CHEST 1 VW    Date of Exam: 4/12/2023 3:58 AM EDT    Indication: chest pain.    Comparison: None available.    Findings:  The heart, mediastinum, and pulmonary vasculature appear to be within normal limits. The lungs appear well-expanded and clear. No evidence of effusion or pneumothorax is seen. Bony  structures appear to be intact.      Impression:      Impression:  No evidence of active chest disease.    Electronically Signed: Navi Pederson    4/12/2023 8:12 AM EDT    Workstation ID: [id number]          Assessment & Plan   Impression        Acute ST elevation myocardial infarction (STEMI) due to occlusion of mid portion of left anterior descending (LAD) coronary artery    Alcohol abuse with withdrawal in the past.    Ventricular fibrillation cardiac arrest       Plan        62-year-old male with past medical history significant for psoriasis, hypertension, and alcohol abuse.  Who presents to [facility] ICU on 4/11/2021 status post STEMI with V-fib arrest and received 1 round of CPR and shock.    -CAD/STEMI management per cardiology  -Chest pain very likely secondary to CPR/shock, would recommend Tylenol for pain control  -Monitor for signs for alcohol withdrawal, CIWA protocol  -Thiamine given  -A.m. labs  -Patient medically okay to be transferred to the floor    I conducted multidisciplinary rounds in the plan of care was discussed with the multidisciplinary team at that time. In attendance at multidisciplinary rounds was clinical pharmacist, dietitian, nursing staff, and case management.    I discussed the patient's findings and my recommendations with patient and nursing staff       Shonna Massey,   Pulmonary, Critical care and Sleep Medicine

## 2023-04-12 NOTE — CASE MANAGEMENT/SOCIAL WORK
Discharge Planning Assessment  [facility]     Patient Name: Santosh Medellin  MRN: [id number]  Today's Date: 4/12/2023    Admit Date: 4/11/2023    Plan: Home with spouse   Discharge Needs Assessment     Row Name 04/12/23 0824       Living Environment    People in Home spouse    Name(s) of People in Home Liz Medellin (spouse) [phone]    Current Living Arrangements home    Potentially Unsafe Housing Conditions none    Primary Care Provided by self    Provides Primary Care For no one    Family Caregiver if Needed spouse    Able to Return to Prior Arrangements yes       Resource/Environmental Concerns    Resource/Environmental Concerns none    Transportation Concerns none       Transition Planning    Patient/Family Anticipates Transition to home with family    Patient/Family Anticipated Services at Transition none    Transportation Anticipated family or friend will provide       Discharge Needs Assessment    Readmission Within the Last 30 Days no previous admission in last 30 days    Equipment Currently Used at Home none    Concerns to be Addressed denies needs/concerns at this time    Anticipated Changes Related to Illness none    Equipment Needed After Discharge none               Discharge Plan     Row Name 04/12/23 0840       Plan    Plan Home with spouse    Patient/Family in Agreement with Plan yes    Plan Comments Spoke to patient at bedside. Lives with Liz Medellin (spouse) [phone] in [place]. Is independent with ADL's. No problems with [place] BCBS or medications. Uses no DME at home. No advanced directives. PCP is Herb Dueñas MD. Plan is home with spouse. Spouse will transport. CM will continue to follow.    Final Discharge Disposition Code 01 - home or self-care              Continued Care and Services - Admitted Since 4/11/2023    Coordination has not been started for this encounter.          Demographic Summary     Row Name 04/12/23 0824       General Information    Admission Type inpatient     Arrived From procedure suite    Referral Source admission list    Reason for Consult discharge planning    Preferred Language English       Contact Information    Permission Granted to Share Info With     Contact Information Obtained for                Functional Status    No documentation.                Psychosocial    No documentation.                Abuse/Neglect    No documentation.                Legal    No documentation.                Substance Abuse    No documentation.                Patient Forms    No documentation.                   Kye Ng RN

## 2023-04-12 NOTE — PROGRESS NOTES
"[facility] Cardiology Daily Note       LOS: 1 day   Patient Care Team:  Provider, No Known as PCP - General    Chief Complaint: Anterior ST elevation MI    Subjective     Subjective: Complains of pain in his chest just to the right of his sternum at the area of the second rib.  This happened about 5 hours after his procedure.  Otherwise he is doing well overnight.  He would like to get up and move around.  No issues with his groin.  97% on room air.  Blood pressures and heart rates have been good overnight.    Review of Systems:   As above.    Medications:  aspirin, 81 mg, Oral, Daily  atropine sulfate, , ,   bisoprolol, 2.5 mg, Oral, Q24H  clopidogrel, 75 mg, Oral, Daily  thiamine, 100 mg, Oral, Daily   And  multivitamin, 1 tablet, Oral, Daily   And  folic acid, 1 mg, Oral, Daily  LORazepam, 1 mg, Oral, Q6H   Followed by  [START ON 4/13/2023] LORazepam, 1 mg, Oral, Q8H  rosuvastatin, 20 mg, Oral, Nightly        Objective     Vital Sign Min/Max for last 24 hours  Temp  Min: 98.1 °F (36.7 °C)  Max: 98.5 °F (36.9 °C)   BP  Min: 99/73  Max: 168/99   Pulse  Min: 61  Max: 101   Resp  Min: 16  Max: 19   SpO2  Min: 94 %  Max: 99 %   No data recorded   Weight  Min: 86.2 kg (190 lb)  Max: 86.2 kg (190 lb)      Intake/Output Summary (Last 24 hours) at 4/12/2023 0708  Last data filed at 4/12/2023 0600  Gross per 24 hour   Intake 425 ml   Output 1300 ml   Net -875 ml        Flowsheet Rows    Flowsheet Row First Filed Value   Admission Height 182.9 cm (72\") Documented at 04/11/2023 1600   Admission Weight 86.2 kg (190 lb) Documented at 04/11/2023 1600          Physical Exam:    General: Alert and oriented.   Cardiovascular: Heart has a nondisplaced focal PMI. Regular rate and rhythm without murmur, gallop or rub.  Tender second rib near the costal cartilage.  Lungs: Clear without rales or wheezes. Equal expansion is noted.  Decreased breath sounds  Abdomen: Soft, nontender.  Extremities: Show no edema. No " bruit.  No hematoma.  Skin: warm and dry.     Results Review:    I reviewed the patient's new clinical results.  EKG:  Tele: Sinus rhythm.  No ventricular tachycardia no ventricular fibrillation.    Labs:    Results from last 7 days   Lab Units 04/12/23  0556 04/11/23 2015 04/11/23  1655   SODIUM mmol/L 138 140  --    POTASSIUM mmol/L 4.2 3.9  --    CHLORIDE mmol/L 106 104  --    CO2 mmol/L 20.0* 19.0*  --    BUN mg/dL 12 13  --    CREATININE mg/dL 0.80 0.89 1.20   CALCIUM mg/dL 8.8 9.0  --    BILIRUBIN mg/dL  --  0.6  --    ALK PHOS U/L  --  65  --    ALT (SGPT) U/L  --  61*  --    AST (SGOT) U/L  --  109*  --    GLUCOSE mg/dL 124* 120*  --      Results from last 7 days   Lab Units 04/12/23  0556   WBC 10*3/mm3 7.36   HEMOGLOBIN g/dL 15.2   HEMATOCRIT % 43.5   PLATELETS 10*3/mm3 111*     Lab Results   Component Value Date    TROPONINT 1,790 (C) 04/11/2023    TROPONINT 1,277 (C) 04/11/2023     Lab Results   Component Value Date    CHOL 267 (H) 04/12/2023     Lab Results   Component Value Date    TRIG 164 (H) 04/12/2023     Lab Results   Component Value Date    HDL 60 04/12/2023     No components found for: LDLCALC  No results found for: INR, PROTIME      Ejection Fraction:    Assessment   Assessment:    1. Coronary artery disease  1. Status post 3.0 x 15 mm Xience CARSON to the proximal LAD.  2. Preserved ejection fraction  2. Hyperlipidemia  3. Thrombocytopenia    Plan:    To telemetry  Continue rosuvastatin for hyperlipidemia  May shower daily  Continue aspirin and Plavix for DAPT  Continue bisoprolol for acute MI.  Of note the dose ordered last evening was not given even though it was ordered for last evening.    Magy Francis MD  04/12/23  07:08 EDT

## 2023-04-13 ENCOUNTER — READMISSION MANAGEMENT (OUTPATIENT)
Dept: CALL CENTER | Facility: HOSPITAL | Age: 63
End: 2023-04-13
Payer: COMMERCIAL

## 2023-04-13 VITALS
OXYGEN SATURATION: 94 % | HEIGHT: 72 IN | BODY MASS INDEX: 25.19 KG/M2 | RESPIRATION RATE: 16 BRPM | WEIGHT: 186 LBS | SYSTOLIC BLOOD PRESSURE: 117 MMHG | HEART RATE: 86 BPM | TEMPERATURE: 99.3 F | DIASTOLIC BLOOD PRESSURE: 80 MMHG

## 2023-04-13 RX ORDER — ASPIRIN 81 MG/1
81 TABLET ORAL DAILY
Qty: 30 TABLET | Refills: 11 | Status: SHIPPED | OUTPATIENT
Start: 2023-04-14

## 2023-04-13 RX ORDER — BISOPROLOL FUMARATE 5 MG/1
10 TABLET, FILM COATED ORAL
Status: DISCONTINUED | OUTPATIENT
Start: 2023-04-14 | End: 2023-04-13 | Stop reason: HOSPADM

## 2023-04-13 RX ORDER — CLOPIDOGREL BISULFATE 75 MG/1
75 TABLET ORAL DAILY
Qty: 30 TABLET | Refills: 11 | Status: SHIPPED | OUTPATIENT
Start: 2023-04-14

## 2023-04-13 RX ORDER — ROSUVASTATIN CALCIUM 20 MG/1
20 TABLET, COATED ORAL NIGHTLY
Qty: 90 TABLET | Refills: 3 | Status: SHIPPED | OUTPATIENT
Start: 2023-04-13

## 2023-04-13 RX ORDER — BISOPROLOL FUMARATE 10 MG/1
10 TABLET, FILM COATED ORAL
Qty: 30 TABLET | Refills: 11 | Status: SHIPPED | OUTPATIENT
Start: 2023-04-14

## 2023-04-13 RX ORDER — LORAZEPAM 0.5 MG/1
TABLET ORAL
Qty: 6 TABLET | Refills: 0 | Status: SHIPPED | OUTPATIENT
Start: 2023-04-13 | End: 2023-04-16

## 2023-04-13 RX ADMIN — CLOPIDOGREL BISULFATE 75 MG: 75 TABLET ORAL at 08:25

## 2023-04-13 RX ADMIN — MULTIVITAMIN TABLET 1 TABLET: TABLET at 08:25

## 2023-04-13 RX ADMIN — NAPROXEN 250 MG: 250 TABLET ORAL at 09:14

## 2023-04-13 RX ADMIN — FOLIC ACID 1 MG: 1 TABLET ORAL at 08:25

## 2023-04-13 RX ADMIN — THIAMINE HCL TAB 100 MG 100 MG: 100 TAB at 08:24

## 2023-04-13 RX ADMIN — ASPIRIN 81 MG: 81 TABLET, COATED ORAL at 08:24

## 2023-04-13 NOTE — PROGRESS NOTES
"[facility] Cardiology Daily Note       LOS: 2 days   Patient Care Team:  Elías Dueñas MD as PCP - General (Internal Medicine)    Chief Complaint: Anterior ST elevation MI    Subjective     Subjective: No complaints this morning.  Chest discomfort in the sternal area is much improved by about 80%.  Apparently had some diaphoresis last evening possibly felt related to alcohol withdrawal.  (He drinks 5 shots per night)      Review of Systems:   As above.    Medications:  aspirin, 81 mg, Oral, Daily  bisoprolol, 2.5 mg, Oral, BID  clopidogrel, 75 mg, Oral, Daily  thiamine, 100 mg, Oral, Daily   And  multivitamin, 1 tablet, Oral, Daily   And  folic acid, 1 mg, Oral, Daily  naproxen, 250 mg, Oral, BID With Meals  pharmacy consult - [place], , Does not apply, Daily  rosuvastatin, 20 mg, Oral, Nightly        Objective     Vital Sign Min/Max for last 24 hours  Temp  Min: 98.4 °F (36.9 °C)  Max: 99.1 °F (37.3 °C)   BP  Min: 122/91  Max: 148/101   Pulse  Min: 71  Max: 98   Resp  Min: 16  Max: 20   SpO2  Min: 94 %  Max: 98 %   No data recorded   Weight  Min: 84.4 kg (186 lb)  Max: 84.6 kg (186 lb 8 oz)      Intake/Output Summary (Last 24 hours) at 4/13/2023 0713  Last data filed at 4/12/2023 1800  Gross per 24 hour   Intake 960 ml   Output 200 ml   Net 760 ml        Flowsheet Rows    Flowsheet Row First Filed Value   Admission Height 182.9 cm (72\") Documented at 04/11/2023 1600   Admission Weight 86.2 kg (190 lb) Documented at 04/11/2023 1600          Physical Exam:    General: Alert and oriented.   Cardiovascular: Heart has a nondisplaced focal PMI. Regular rate and rhythm without murmur, gallop or rub.    Lungs: Clear without rales or wheezes. Equal expansion is noted.    Extremities: Show no edema. No bruit.  No hematoma.  Skin: warm and dry.     Results Review:    I reviewed the patient's new clinical results.  EKG:  Tele: Sinus rhythm.  No ventricular tachycardia no ventricular " fibrillation.    Labs:    Results from last 7 days   Lab Units 04/12/23  0556 04/11/23 2015 04/11/23  1655   SODIUM mmol/L 138 140  --    POTASSIUM mmol/L 4.2 3.9  --    CHLORIDE mmol/L 106 104  --    CO2 mmol/L 20.0* 19.0*  --    BUN mg/dL 12 13  --    CREATININE mg/dL 0.80 0.89 1.20   CALCIUM mg/dL 8.8 9.0  --    BILIRUBIN mg/dL  --  0.6  --    ALK PHOS U/L  --  65  --    ALT (SGPT) U/L  --  61*  --    AST (SGOT) U/L  --  109*  --    GLUCOSE mg/dL 124* 120*  --      Results from last 7 days   Lab Units 04/12/23  0556   WBC 10*3/mm3 7.36   HEMOGLOBIN g/dL 15.2   HEMATOCRIT % 43.5   PLATELETS 10*3/mm3 111*     Lab Results   Component Value Date    TROPONINT 1,790 (C) 04/11/2023    TROPONINT 1,277 (C) 04/11/2023     Lab Results   Component Value Date    CHOL 267 (H) 04/12/2023     Lab Results   Component Value Date    TRIG 164 (H) 04/12/2023     Lab Results   Component Value Date    HDL 60 04/12/2023     No components found for: LDLCALC  No results found for: INR, PROTIME      Ejection Fraction:    Assessment   Assessment:    1. Coronary artery disease  1. Status post 3.0 x 15 mm Xience CARSON to the proximal LAD.  2. Preserved ejection fraction  2. Hyperlipidemia  3. Thrombocytopenia    Plan:    Continue rosuvastatin for hyperlipidemia  May shower daily  Continue aspirin and Plavix for DAPT  Increase bisoprolol to 5 mg daily  Strongly encouraged to quit drinking  Anticipate home later this afternoon.    Magy Francis MD  04/13/23  07:13 EDT

## 2023-04-13 NOTE — CASE MANAGEMENT/SOCIAL WORK
"Continued Stay Note  [facility]     Patient Name: Santosh Medellin  MRN: [id number]  Today's Date: 4/13/2023    Admit Date: 4/11/2023    Plan: Home at discharge   Discharge Plan     Row Name 04/13/23 0937       Plan    Plan Home at discharge    Plan Comments CM continues to follow for DC planning. Per Cardiology: \"Continue rosuvastatin for hyperlipidemia. Anticipate home later this afternoon.\" Patient remains independent of ADLs and has no new needs for DME. Will return home with his spouse upon discharge. No dc needs identified at this time- krista - [phone]    Final Discharge Disposition Code 01 - home or self-care               Discharge Codes    No documentation.                     Krista Hsu RN    "

## 2023-04-13 NOTE — PLAN OF CARE
Goal Outcome Evaluation:  Plan of Care Reviewed With: patient        Progress: no change  Outcome Evaluation: VSS over night. No complaints of pain. CIWA score has been a 0 until around 0400 today. Pt scored a 5 due to tremor and sweating. Pt states that this has happened before when he goes awhile without drinking. Right femoral cath site CDI. Will continue POC

## 2023-04-13 NOTE — DISCHARGE SUMMARY
[facility] Medicine Services  DISCHARGE SUMMARY    Patient Name: Santosh Medellin  : 1960  MRN: [id number]    Date of Admission: 2023  4:48 PM  Date of Discharge: 2023  Primary Care Physician: Elías Dueñas MD    Consults     No orders found from 3/13/2023 to 2023.          Hospital Course     Presenting Problem:   Acute ST elevation myocardial infarction (STEMI) due to occlusion of mid portion of left anterior descending (LAD) coronary artery [I21.02]    Active Hospital Problems    Diagnosis  POA   • **Acute ST elevation myocardial infarction (STEMI) due to occlusion of mid portion of left anterior descending (LAD) coronary artery [I21.02]  Yes   • Ventricular fibrillation cardiac arrest [I49.01]  Yes   • Alcohol abuse with withdrawal in the past. [F10.10]  Yes      Resolved Hospital Problems   No resolved problems to display.          Hospital Course:  Santosh Medellin is a 62 y.o. male who presented to cath lab with anterior STEMI, had V-fib arrest and received 1 round CPR and shock.  He was found to have occlusion of mid portion of LAD and stent was placed.  He will continue DAPT with ASA and plavix, crestor, bisoprolol.  He has a history of alcohol dependence.  He was counseled on cessation.  He had some mild withdrawal symptoms as he has had in the past.  Not scoring high enough for PRNs on the CIWA protocol.  Offered patient continued observation for withdrawal vs. Discharge home with benzodiazepine taper and he preferred the latter.  He was counseled to return to the ED if withdrawal symptoms worsen despite medication and not to drink alcohol or drive while taking ativan.  He was also counseled on the importance of strict adherence to DAPT.      Discharge Follow Up Recommendations for outpatient labs/diagnostics:  F/U Dr. Francis 1 month  F/U PCP 1 week    Day of Discharge     HPI:   Wants to go home.  Having some mild tremors.   Denies any history of severe withdrawal or seizures.    Review of Systems  Gen- No fevers  CV- No chest pain  Resp- No  dyspnea      Vital Signs:   Temp:  [98.6 °F (37 °C)-99.3 °F (37.4 °C)] 99.3 °F (37.4 °C)  Heart Rate:  [74-86] 86  Resp:  [16-20] 16  BP: (102-145)/(68-95) 117/80      Physical Exam:  Constitutional: No acute distress, awake, alert, sitting up in chair  HENT: NCAT, mucous membranes moist  Respiratory: Respiratory effort normal   Cardiovascular: RRR  Musculoskeletal: No bilateral ankle edema  Psychiatric: Appropriate affect, cooperative  Neurologic: Speech clear and fluent  Skin: No rashes on exposed surfaces    Pertinent  and/or Most Recent Results     LAB RESULTS:      Lab 04/12/23 0556   WBC 7.36   HEMOGLOBIN 15.2   HEMATOCRIT 43.5   PLATELETS 111*   MCV 97.8*         Lab 04/12/23  0556 04/11/23 2015 04/11/23  1655   SODIUM 138 140  --    POTASSIUM 4.2 3.9  --    CHLORIDE 106 104  --    CO2 20.0* 19.0*  --    ANION GAP 12.0 17.0*  --    BUN 12 13  --    CREATININE 0.80 0.89 1.20   EGFR 101.3 98.1  --    GLUCOSE 124* 120*  --    CALCIUM 8.8 9.0  --    MAGNESIUM 2.4  --   --    PHOSPHORUS 3.1  --   --    HEMOGLOBIN A1C 5.80*  --   --          Lab 04/11/23 2015   TOTAL PROTEIN 6.4   ALBUMIN 4.0   GLOBULIN 2.4   ALT (SGPT) 61*   AST (SGOT) 109*   BILIRUBIN 0.6   ALK PHOS 65         Lab 04/11/23  2230 04/11/23  2108   HSTROP T 1,790* 1,277*         Lab 04/12/23  0556   CHOLESTEROL 267*   LDL CHOL 177*   HDL CHOL 60   TRIGLYCERIDES 164*             Brief Urine Lab Results     None        Microbiology Results (last 10 days)     ** No results found for the last 240 hours. **          Adult Transthoracic Echo Complete W/ Cont if Necessary Per Protocol    Result Date: 4/12/2023  •  Left ventricular systolic function is mildly decreased. Estimated left ventricular EF = 45-50% •  Normal valvular structure and function.     Cardiac Catheterization/Vascular Study    Result Date: 4/11/2023  FINAL      Successful PTCA/stent placement of a thrombotic lesion in the proximal LAD Minimal disease in the circumflex and RCA. RECOMMENDATIONS: ICU overnight stay as the patient had a VF arrest while in the Cath Lab. High dose statin therapy Aspirin indefinitely Indications: Acute anterior ST elevation MI Access: Right femoral artery Estimated blood loss: Less than 15 mL Procedures: Left heart catheterization. Left ventriculogram. Selective coronary angiography. PTCA/stent placement in the proximal LAD Procedure narrative: The patient was brought to the catheterization lab emergently.  The patient was pain-free on arrival to the Cath Lab but during questioning defibrillated and had to be defibrillated prior to the start of the cath.  Amiodarone 150 mg IV was given x1.  Access site was prepped and draped in standard sterile fashion.  Lidocaine was injected and arterial access was obtained by percutaneous anterior wall puncture technique.  A 6 Dominican arterial sheath was placed in the right femoral artery using a modified Seldinger technique.  Selective coronary arteriography was performed using the Soy technique with a 6 Dominican 4 curved Soy right catheter and a 6 Dominican 4 curved Soy left catheter.  Nonionic contrast was used and was injected manually.  Left ventriculography was performed using 30 ML of contrast power injected at 10 ML per second.  Left heart pressure pull back revealed no gradient. At the time of the procedure the patient was noted to have a mobile thrombus in the proximal LAD.  Heparin was administered for final ACT of greater than 400 seconds.  A single bolus of Integrilin was given.  A 6 Dominican Mach 1 JL 4.5 guide was placed to the level of the left main.  0.014 192 cm luge wire was placed across the lesion with minimal difficulty.  A 2.0 x 12 mm mini trek balloon was placed within the lesion and was inflated to 10 dk for 20 seconds.  A 3.0 x 15 mm Xience drug-eluting stent was placed  within the lesion and deployed at 10 dk for 20 seconds.  Post dilation was performed using 3.0 x 12 mm balloon as the distal vessel narrowed rapidly.  A single inflation was performed to 16 dk for 20 seconds.  An excellent result was obtained.  There were no complications.  Contrast: 150 ml Hemodynamic Findings: LV pressure: 98/4/14 mmHg, on pull back no gradient was recorded across the aortic valve. Ao pressure: 100/65 mmHg Left ventriculography: Single-plane HOLLY left ventriculography disclosed a preserved ejection fraction with minimal mid and distal anterior hypokinesis.  The EF is greater than 55%. Angiographic Findings: LMCA: The left main coronary artery gives rise to the LAD and circumflex vessels and is free of disease. Circumflex: Circumflex coronary artery gives rise to small first obtuse marginal branch moderate second obtuse marginal branch and a bifurcating left atrial branch.  The circumflex and its branches contain minor irregularities. Ramus intermedius: The ramus intermedius is a very large trifurcating vessel which courses anterolaterally and contains minor disease. LAD: The LAD gives rise to a tiny first diagonal branch large bifurcating second diagonal and a moderate third diagonal as well as a moderate fourth diagonal and small apical recurrent branch.  The LAD contains mobile thrombus proximally and is otherwise free of significant disease.  The proximal LAD is fairly heavily calcified. PTCA/stent placement in the proximal LAD: Preprocedure mobile thrombus with an estimated 80% occlusion and LU III flow Post procedure 0% with LU-3 flow Complications none      XR Chest 1 View    Result Date: 4/12/2023  XR CHEST 1 VW Date of Exam: 4/12/2023 3:58 AM EDT Indication: chest pain. Comparison: None available. Findings: The heart, mediastinum, and pulmonary vasculature appear to be within normal limits. The lungs appear well-expanded and clear. No evidence of effusion or pneumothorax is seen. Bony  structures appear to be intact.     Impression: No evidence of active chest disease. Electronically Signed: Navi Bg  4/12/2023 8:12 AM EDT  Workstation ID: [id number]              Results for orders placed during the hospital encounter of 04/11/23    Adult Transthoracic Echo Complete W/ Cont if Necessary Per Protocol    Interpretation Summary  •  Left ventricular systolic function is mildly decreased. Estimated left ventricular EF = 45-50%  •  Normal valvular structure and function.      Plan for Follow-up of Pending Labs/Results: None pending    Discharge Details        Discharge Medications      New Medications      Instructions Start Date   aspirin 81 MG EC tablet   81 mg, Oral, Daily   Start Date: April 14, 2023     bisoprolol 10 MG tablet  Commonly known as: ZEBeta   10 mg, Oral, Every 24 Hours Scheduled   Start Date: April 14, 2023     clopidogrel 75 MG tablet  Commonly known as: PLAVIX   75 mg, Oral, Daily   Start Date: April 14, 2023     LORazepam 0.5 MG tablet  Commonly known as: Ativan   Take 1 tablet by mouth 3 (Three) Times a Day for 1 day, THEN 1 tablet 2 (Two) Times a Day for 1 day, THEN 1 tablet Every Night for 1 day.   Start Date: April 13, 2023     rosuvastatin 20 MG tablet  Commonly known as: CRESTOR   20 mg, Oral, Nightly         Continue These Medications      Instructions Start Date   Stelara 45 MG/0.5ML injection  Generic drug: ustekinumab   45 mg, Subcutaneous, Every 3 Months         Stop These Medications    indomethacin 50 MG capsule  Commonly known as: INDOCIN            No Known Allergies      Discharge Disposition:  Home or Self Care    Diet:  Hospital:No active diet order         CODE STATUS:    Code Status and Medical Interventions:   Ordered at: 04/11/23 3735     Level Of Support Discussed With:    Patient     Code Status (Patient has no pulse and is not breathing):    CPR (Attempt to Resuscitate)     Medical Interventions (Patient has pulse or is breathing):    Full       Future  Appointments   Date Time Provider Department Center   4/25/2023  1:00 PM OR[facility]ATION [facility]   6/29/2023  1:45 PM Magy Francis MD [facility]       Additional Instructions for the Follow-ups that You Need to Schedule     Discharge Follow-up with PCP   As directed       Currently Documented PCP:    Elías Dueñas MD    PCP Phone Number:    [phone]     Follow Up Details: 1 week         Discharge Follow-up with Specified Provider: Penny; 1 Month   As directed      To: Penny    Follow Up: 1 Month                     Mary Jo Edge MD  04/13/23      Time Spent on Discharge:  I spent  31  minutes on this discharge activity which included: face-to-face encounter with the patient, reviewing the data in the system, coordination of the care with the nursing staff as well as consultants, documentation, and entering orders.

## 2023-04-14 NOTE — OUTREACH NOTE
Prep Survey    Flowsheet Row Responses   [facility] facility patient discharged from? [facility]   Is LACE score < 7 ? No   Eligibility Readm Mgmt   Discharge diagnosis Acute ST elevation myocardial infarction (STEMI) due to occlusion of mid portion of left anterior descending (LAD) coronary artery   Does the patient have one of the following disease processes/diagnoses(primary or secondary)? Acute MI (STEMI,NSTEMI)   Does the patient have Home health ordered? No   Is there a DME ordered? No   Prep survey completed? Yes          Celia CHIANG - Registered Nurse

## 2023-04-17 ENCOUNTER — READMISSION MANAGEMENT (OUTPATIENT)
Dept: CALL CENTER | Facility: HOSPITAL | Age: 63
End: 2023-04-17
Payer: COMMERCIAL

## 2023-04-17 NOTE — OUTREACH NOTE
"AMI Week 1 Survey    Flowsheet Row Responses   [facility] patient discharged from? [facility]   Does the patient have one of the following disease processes/diagnoses(primary or secondary)? Acute MI (STEMI,NSTEMI)   Week 1 attempt successful? Yes   Call start time 1347   Call end time 1354   Discharge diagnosis Acute ST elevation myocardial infarction (STEMI) due to occlusion of mid portion of left anterior descending (LAD) coronary artery   Person spoke with today (if not patient) and relationship wife   Meds reviewed with patient/caregiver? Yes   Is the patient having any side effects they believe may be caused by any medication additions or changes? No   Does the patient have all prescriptions related to this admission filled (includes statins,anticoagulants,HTN meds,anti-arrhythmia meds) Yes   Is the patient taking all medications as directed (includes completed medication regime)? Yes   Does the patient have a primary care provider?  Yes   Does the patient have an appointment with their PCP,cardiologist,or clinic within 7 days of discharge? Yes   Has the patient kept scheduled appointments due by today? Yes   Psychosocial issues? Yes   Psychosocial comments both wife and pt are withdrawing from alcohol per wife and pt has decreased use of Ativan.   Comments R.fem [facility] site: no issue per wife. /63, per wife pt feels \"good\".   Did the patient receive a copy of their discharge instructions? Yes   Nursing interventions Reviewed instructions with patient   What is the patient's perception of their health status since discharge? Improving   Nursing interventions Nurse provided patient education   Is the patient/caregiver able to teach back signs and symptoms of when to call for help immediately: Sudden chest discomfort, Sudden discomfort in arms, back, neck or jaw, Shortness of breath at any time   Nursing interventions Nurse provided patient education   Is the patient/caregiver able to teach back lifestyle " changes to help prevent MIs Regular exercise as approved by provider, Maintaining a healthy weight   Is the patient/caregiver able to teach back ways to prevent a second heart attack: Take medications, Follow up with MD   If the patient is a current smoker, are they able to teach back resources for cessation? Not a smoker   Is the patient/caregiver able to teach back the hierarchy of who to call/visit for symptoms/problems? PCP, Specialist, Home health nurse, Urgent Care, ED, 911 Yes   Week 1 call completed? Yes   Revoked No further contact(revokes)-requires comment   Is the patient interested in additional calls from an ambulatory ?  NOTE:  applies to high risk patients requiring additional follow-up. No          Ary CHIANG - Registered Nurse

## 2023-04-19 ENCOUNTER — TREATMENT (OUTPATIENT)
Dept: PHYSICAL THERAPY | Facility: CLINIC | Age: 63
End: 2023-04-19
Payer: COMMERCIAL

## 2023-04-19 ENCOUNTER — TRANSCRIBE ORDERS (OUTPATIENT)
Dept: PHYSICAL THERAPY | Facility: CLINIC | Age: 63
End: 2023-04-19
Payer: COMMERCIAL

## 2023-04-19 DIAGNOSIS — M24.541 CONTRACTURE OF JOINT OF FINGER OF RIGHT HAND: Primary | ICD-10-CM

## 2023-04-19 PROCEDURE — L3925 FO PIP DIP JNT/SPRNG PRE OTS: HCPCS | Performed by: PHYSICAL THERAPIST

## 2023-04-19 NOTE — PROGRESS NOTES
Santosh Medellin 1960   Diagnosis/ Surgery: right ring PIPJ contracture               Date Of Injury: 5 years    Date Of Surgery:NA    Hand Dominance: right   History of Present Condition: progressive contracture.   Medical/Vocational History/ Medications: retired     Pain: 0/10    Edema: none   Sensibility: WNL   Wound Status:NA  ROM/ Strength:  Ring finger PIPJ 25 deg flexion contracture     Splinting:  · Patient was measure and fit with a prefabricated joint jack static progressive splint.    · Patient was instructed in wearing schedule, precautions and care of the splint during this visit.   · Patient was instructed in proper donning/doffing of splint.   Assessment:  · Patient was fitted and appropriate splint was fabricated this date.  · Patient reported that splint was comfortable and had no complications with the fit of the splint.  · Patient was instructed and patient verbalized understanding of precautions, wear and care of the splint.   · Patient demonstrated independent donning/doffing of splint during treatment today.  Goals:  · Patient was fitted properly with appropriate splint for diagnosis  · Patient was educated on precautions, wear schedule and care of splint  · Patient demonstrated independence with donning/doffing of the splint.  · Splint was provided to Protect Healing Structures, Restrict Mobility, Improve joint alignment.  Plan:  · No additional treatment is required for this patient at this time. The patient is therefore discharged from therapy.  · Patient advised to contact therapist with any additional questions or concerns regarding the fit and function of the splint.  · Patient will be seen for splint issues as needed   · Wear Instructions: night use           PT SIGNATURE: Lina Merino, JEMIMA   DATE TREATMENT INITIATED: 4/19/2023    Initial Certification  Certification Period: 7/18/2023  I certify that the therapy services are furnished while this patient is under my care.  The  services outlined above are required by this patient, and will be reviewed every 90 days.     PHYSICIAN: Gris French MD      DATE:     Please sign and return via fax to [phone].. Thank you, [facility].

## 2023-04-25 ENCOUNTER — TREATMENT (OUTPATIENT)
Dept: CARDIAC REHAB | Facility: HOSPITAL | Age: 63
End: 2023-04-25
Payer: COMMERCIAL

## 2023-04-25 DIAGNOSIS — I21.3 ST ELEVATION MYOCARDIAL INFARCTION (STEMI), UNSPECIFIED ARTERY: ICD-10-CM

## 2023-04-25 PROCEDURE — 93798 PHYS/QHP OP CAR RHAB W/ECG: CPT

## 2023-04-29 LAB
ACT BLD: 161 SECONDS (ref 82–152)
ACT BLD: 179 SECONDS (ref 82–152)

## 2023-05-03 ENCOUNTER — TELEPHONE (OUTPATIENT)
Dept: CARDIOLOGY | Facility: CLINIC | Age: 63
End: 2023-05-03
Payer: COMMERCIAL

## 2023-05-03 NOTE — TELEPHONE ENCOUNTER
Patient called to asked if Dr. Francis could prescribe a drug for his elevated uric acid.   Went to PCP who who referred him to us since he cannot be on NSAID's due to his blood thinners and was wondering if we had any other options.     Patient has not been seen in the office yet. He had a left heart cath on 04/11/2023 for a STEMI.     Hospital Course:  Santosh Medellin is a 62 y.o. male who presented to cath lab with anterior STEMI, had V-fib arrest and received 1 round CPR and shock.  He was found to have occlusion of mid portion of LAD and stent was placed.  He will continue DAPT with ASA and plavix, crestor, bisoprolol.  He has a history of alcohol dependence.  He was counseled on cessation.  He had some mild withdrawal symptoms as he has had in the past.  Not scoring high enough for PRNs on the CIWA protocol.  Offered patient continued observation for withdrawal vs. Discharge home with benzodiazepine taper and he preferred the latter.  He was counseled to return to the ED if withdrawal symptoms worsen despite medication and not to drink alcohol or drive while taking ativan.  He was also counseled on the importance of strict adherence to DAPT.

## 2023-05-04 ENCOUNTER — DOCUMENTATION (OUTPATIENT)
Dept: CARDIAC REHAB | Facility: HOSPITAL | Age: 63
End: 2023-05-04
Payer: COMMERCIAL

## 2023-05-04 ENCOUNTER — TREATMENT (OUTPATIENT)
Dept: CARDIAC REHAB | Facility: HOSPITAL | Age: 63
End: 2023-05-04
Payer: COMMERCIAL

## 2023-05-04 DIAGNOSIS — I21.3 ST ELEVATION MYOCARDIAL INFARCTION (STEMI), UNSPECIFIED ARTERY: Primary | ICD-10-CM

## 2023-05-04 PROCEDURE — 93797 PHYS/QHP OP CAR RHAB WO ECG: CPT

## 2023-05-04 PROCEDURE — 93798 PHYS/QHP OP CAR RHAB W/ECG: CPT

## 2023-05-04 NOTE — TELEPHONE ENCOUNTER
Called patient to relay recommendations. No answer. Left recommendations on voicemail and patient to call back if he has any questions.

## 2023-05-04 NOTE — TELEPHONE ENCOUNTER
Magy Francis MD  You 16 hours ago (4:55 PM)       Recently on aspirin and Plavix.  He can either be on colchicine or Indocin for an acute gout flare or can be on allopurinol for chronic suppression of uric acid levels.  His primary physician can decide on that.

## 2023-05-04 NOTE — PROGRESS NOTES
Attended Phase II Cardiac Rehab. No medication or health history changes reported. See [facility] for details. Patient also attended Intro to Exercise class.

## 2023-05-04 NOTE — PROGRESS NOTES
Patient reports he did see his PCP about his reddened, swollen and painful left knee. He states he was told he has bursitis and he is now on Amoxicillin.  He also is suffering from gout in his right great toe but states he has been okay exercising today.

## 2023-05-05 ENCOUNTER — TREATMENT (OUTPATIENT)
Dept: CARDIAC REHAB | Facility: HOSPITAL | Age: 63
End: 2023-05-05
Payer: COMMERCIAL

## 2023-05-05 DIAGNOSIS — I21.3 ST ELEVATION MYOCARDIAL INFARCTION (STEMI), UNSPECIFIED ARTERY: Primary | ICD-10-CM

## 2023-05-05 PROCEDURE — 93798 PHYS/QHP OP CAR RHAB W/ECG: CPT

## 2023-05-08 ENCOUNTER — TREATMENT (OUTPATIENT)
Dept: CARDIAC REHAB | Facility: HOSPITAL | Age: 63
End: 2023-05-08
Payer: COMMERCIAL

## 2023-05-08 DIAGNOSIS — I21.3 ST ELEVATION MYOCARDIAL INFARCTION (STEMI), UNSPECIFIED ARTERY: Primary | ICD-10-CM

## 2023-05-08 PROCEDURE — 93798 PHYS/QHP OP CAR RHAB W/ECG: CPT

## 2023-05-10 ENCOUNTER — TREATMENT (OUTPATIENT)
Dept: CARDIAC REHAB | Facility: HOSPITAL | Age: 63
End: 2023-05-10
Payer: COMMERCIAL

## 2023-05-10 DIAGNOSIS — I21.3 ST ELEVATION MYOCARDIAL INFARCTION (STEMI), UNSPECIFIED ARTERY: Primary | ICD-10-CM

## 2023-05-10 PROCEDURE — 93798 PHYS/QHP OP CAR RHAB W/ECG: CPT

## 2023-05-10 PROCEDURE — 93797 PHYS/QHP OP CAR RHAB WO ECG: CPT

## 2023-05-10 NOTE — PROGRESS NOTES
"  NUTRITION ASSESSMENT & EDUCATION  CARDIAC/PULMONARY REHAB      Appointment Date and Time: 05/10/23, 08:58 EDT  Patient Name: Santosh Medellin   Age: 62 y.o.     Sex:  male      Employment/Activity Level:   Santosh's education level: college  Occupation:  Retired since 2018             Job Activity Level: n/a  Routine Exercise: mild. Relatively sedentary.                                   He does walk neighbors small dog a few times/week, does yard work.                                  We discussed increasing activity and he is agreeable to begin walking the 1.3 mi walk track near home twice weekly.                                    Weight Assessment:   Height:   Ht Readings from Last 1 Encounters:   04/12/23 182.9 cm (72\")     Weight:   Wt Readings from Last 1 Encounters:   04/12/23 84.4 kg (186 lb)         BMI:    BMI Readings from Last 1 Encounters:   04/12/23 25.23 kg/m²       -------------------------------------------------------------------------------------------------  IBW: Ideal body weight: 77.6 kg (171 lb 1.2 oz)  Adjusted ideal body weight: 80.3 kg (177 lb 0.7 oz)  -------------------------------------------------------------------------------------------------  Weight Assessment: Overweight/normal for age  Weight Change last six months: relatively stable wt within 5 lb       Usual Weight: 185-190 lb       Desired Weight: 185-190 lb  Cardiac Risk Factors:         Atherosclerotic heart disease       Stents       Hypertension       Sedentary life style    Pertinent Lab Values:     Total Cholesterol   Date Value Ref Range Status   04/12/2023 267 (H) 0 - 200 mg/dL Final     HDL Cholesterol   Date Value Ref Range Status   04/12/2023 60 40 - 60 mg/dL Final     LDL Cholesterol    Date Value Ref Range Status   04/12/2023 177 (H) 0 - 100 mg/dL Final     LDL/HDL Ratio   Date Value Ref Range Status   04/12/2023 2.90  Final     Triglycerides   Date Value Ref Range Status   04/12/2023 164 (H) 0 - 150 mg/dL " Final     Hemoglobin A1C   Date Value Ref Range Status   04/12/2023 5.80 (H) 4.80 - 5.60 % Final     Glucose   Date Value Ref Range Status   04/12/2023 124 (H) 65 - 99 mg/dL Final     Pertinent Medications:   Nutritional Supplements: reviewed  Pertinent Nutrition-Related Medications: Reviewed - no changes recommended at this time.  Self Identified Problems or Concerns:   Cutting back on alcohol, eating healthier. He and wife have specific food preferences    Nutrition Influences:   Appetite: good            Taste/smell changes:  No  Factors limiting PO intake: none  Food records reviewed?: Yes, completed review of 'Rate Your Plate' score and tallies.  Extensive review and discussion of home diet today.    Santosh lives with: wife Liz Frost receives lifestyle support from others at home?: Yes  Spouse/significant other present for diet instruction today?: No  Diet Assessment:   Diet Survey Score: 52 of possible 72 = 72 % compliant with AHA guidelines    Meal intake pattern:  3 meals/day  Dining out: weekly on Fridays       Fast food:  occasionally    24 hour recall:   B oatmeal, banana, walnuts   L leftover chicken and Hardy sprouts   D chicken with stir ramos vegetables    Who does the cooking at home:  Santosh           Who does the grocery shopping:  Liz  Home diet review:  Generally Heart Healthy Fat intake, has made changes toward American Heart Association guidelines, Moderate-acceptable Sodium intake, aware of sodium guidelines and strives to reduce intake, High in eating out- restaurant and/or fast food- potential for excess portioning, sodium, saturated fats, Has recently made positive changes toward AHA nutrition guidelines and Expressed motivation to make heart healthy changes in diet today    Motivation level toward diet compliance:  moderate  Assessment / Recommendations:   Prior diet education before to coming to Cardiac Rehab?: No  Instructed provided on:  Cardiac diet, Label reading for heart health,  Suggested servings from each Food Group, Dietary Fats and Your Heart, Viscous Fiber and your Cholesterol, Omega 3 fats in Fish & Seafood, Sodium Guidelines <2400 mg/day and Added Sugars recommended allowance  Written materials provided to patient: Yes    Goals/Plan:   Patient defined goals:   1) Increase frequency and variety of fish intake  2) Increase dried bean intake - try new recipes  3) Increase fruit/vegetable intake to minimum 5 servings total/day  4) Have a variety of nuts daily  5) Start twice weekly walking on 1.3 mile track near home    Plan:  Encouraged to complete goals and continue setting new nutrition/exercise goals             Encouraged to follow up with dietitian during cardiac rehab sessions; may request additional RD counseling.    Iris Chiu RD, 08:58 EDT - 5/10/2023

## 2023-05-12 ENCOUNTER — TREATMENT (OUTPATIENT)
Dept: CARDIAC REHAB | Facility: HOSPITAL | Age: 63
End: 2023-05-12
Payer: COMMERCIAL

## 2023-05-12 DIAGNOSIS — I21.3 ST ELEVATION MYOCARDIAL INFARCTION (STEMI), UNSPECIFIED ARTERY: Primary | ICD-10-CM

## 2023-05-12 PROCEDURE — 93798 PHYS/QHP OP CAR RHAB W/ECG: CPT

## 2023-05-15 ENCOUNTER — TREATMENT (OUTPATIENT)
Dept: CARDIAC REHAB | Facility: HOSPITAL | Age: 63
End: 2023-05-15
Payer: COMMERCIAL

## 2023-05-15 DIAGNOSIS — I21.3 ST ELEVATION MYOCARDIAL INFARCTION (STEMI), UNSPECIFIED ARTERY: Primary | ICD-10-CM

## 2023-05-15 PROCEDURE — 93798 PHYS/QHP OP CAR RHAB W/ECG: CPT

## 2023-05-17 ENCOUNTER — TREATMENT (OUTPATIENT)
Dept: CARDIAC REHAB | Facility: HOSPITAL | Age: 63
End: 2023-05-17
Payer: COMMERCIAL

## 2023-05-17 DIAGNOSIS — I21.3 ST ELEVATION MYOCARDIAL INFARCTION (STEMI), UNSPECIFIED ARTERY: Primary | ICD-10-CM

## 2023-05-17 PROCEDURE — 93798 PHYS/QHP OP CAR RHAB W/ECG: CPT

## 2023-05-19 ENCOUNTER — TREATMENT (OUTPATIENT)
Dept: CARDIAC REHAB | Facility: HOSPITAL | Age: 63
End: 2023-05-19
Payer: COMMERCIAL

## 2023-05-19 DIAGNOSIS — I21.3 ST ELEVATION MYOCARDIAL INFARCTION (STEMI), UNSPECIFIED ARTERY: Primary | ICD-10-CM

## 2023-05-19 PROCEDURE — 93798 PHYS/QHP OP CAR RHAB W/ECG: CPT

## 2023-05-22 ENCOUNTER — TREATMENT (OUTPATIENT)
Dept: CARDIAC REHAB | Facility: HOSPITAL | Age: 63
End: 2023-05-22
Payer: COMMERCIAL

## 2023-05-22 DIAGNOSIS — I21.3 ST ELEVATION MYOCARDIAL INFARCTION (STEMI), UNSPECIFIED ARTERY: Primary | ICD-10-CM

## 2023-05-22 PROCEDURE — 93798 PHYS/QHP OP CAR RHAB W/ECG: CPT

## 2023-05-24 ENCOUNTER — TREATMENT (OUTPATIENT)
Dept: CARDIAC REHAB | Facility: HOSPITAL | Age: 63
End: 2023-05-24
Payer: COMMERCIAL

## 2023-05-24 DIAGNOSIS — I21.3 ST ELEVATION MYOCARDIAL INFARCTION (STEMI), UNSPECIFIED ARTERY: Primary | ICD-10-CM

## 2023-05-24 PROCEDURE — 93798 PHYS/QHP OP CAR RHAB W/ECG: CPT

## 2023-05-26 ENCOUNTER — TREATMENT (OUTPATIENT)
Dept: CARDIAC REHAB | Facility: HOSPITAL | Age: 63
End: 2023-05-26
Payer: COMMERCIAL

## 2023-05-26 DIAGNOSIS — I21.3 ST ELEVATION MYOCARDIAL INFARCTION (STEMI), UNSPECIFIED ARTERY: Primary | ICD-10-CM

## 2023-05-26 PROCEDURE — 93798 PHYS/QHP OP CAR RHAB W/ECG: CPT

## 2023-05-31 ENCOUNTER — TREATMENT (OUTPATIENT)
Dept: CARDIAC REHAB | Facility: HOSPITAL | Age: 63
End: 2023-05-31
Payer: COMMERCIAL

## 2023-05-31 DIAGNOSIS — I21.3 ST ELEVATION MYOCARDIAL INFARCTION (STEMI), UNSPECIFIED ARTERY: Primary | ICD-10-CM

## 2023-05-31 PROCEDURE — 93798 PHYS/QHP OP CAR RHAB W/ECG: CPT

## 2023-06-02 ENCOUNTER — TREATMENT (OUTPATIENT)
Dept: CARDIAC REHAB | Facility: HOSPITAL | Age: 63
End: 2023-06-02
Payer: COMMERCIAL

## 2023-06-02 DIAGNOSIS — I21.3 ST ELEVATION MYOCARDIAL INFARCTION (STEMI), UNSPECIFIED ARTERY: Primary | ICD-10-CM

## 2023-06-02 PROCEDURE — 93798 PHYS/QHP OP CAR RHAB W/ECG: CPT

## 2023-06-05 ENCOUNTER — TREATMENT (OUTPATIENT)
Dept: CARDIAC REHAB | Facility: HOSPITAL | Age: 63
End: 2023-06-05
Payer: COMMERCIAL

## 2023-06-05 DIAGNOSIS — I21.3 ST ELEVATION MYOCARDIAL INFARCTION (STEMI), UNSPECIFIED ARTERY: Primary | ICD-10-CM

## 2023-06-05 PROCEDURE — 93798 PHYS/QHP OP CAR RHAB W/ECG: CPT

## 2023-06-07 ENCOUNTER — OFFICE VISIT (OUTPATIENT)
Dept: CARDIOLOGY | Facility: CLINIC | Age: 63
End: 2023-06-07
Payer: COMMERCIAL

## 2023-06-07 VITALS
BODY MASS INDEX: 25.22 KG/M2 | HEART RATE: 82 BPM | DIASTOLIC BLOOD PRESSURE: 78 MMHG | HEIGHT: 72 IN | OXYGEN SATURATION: 94 % | WEIGHT: 186.2 LBS | SYSTOLIC BLOOD PRESSURE: 122 MMHG

## 2023-06-07 DIAGNOSIS — I25.10 CORONARY ARTERY DISEASE INVOLVING NATIVE CORONARY ARTERY OF NATIVE HEART WITHOUT ANGINA PECTORIS: Primary | ICD-10-CM

## 2023-06-07 DIAGNOSIS — I10 ESSENTIAL HYPERTENSION: ICD-10-CM

## 2023-06-07 DIAGNOSIS — I21.3 ST ELEVATION MYOCARDIAL INFARCTION (STEMI), UNSPECIFIED ARTERY: ICD-10-CM

## 2023-06-07 DIAGNOSIS — E78.49 OTHER HYPERLIPIDEMIA: ICD-10-CM

## 2023-06-07 RX ORDER — ALLOPURINOL 100 MG/1
TABLET ORAL
COMMUNITY
Start: 2023-05-08

## 2023-06-07 NOTE — PROGRESS NOTES
[facility] Cardiology    Patient ID: Santosh Medellin is a 62 y.o. male.  : 1960   Contact: [phone]    Encounter date: 2023    PCP: Elías Dueñas MD      Chief complaint:   Chief Complaint   Patient presents with    Follow up Stent Surgery      4 week follow up        Problem List:  Coronary artery disease  Acute STEMI due to occlusion of mid portion of left anterior descending (LAD) coronary artery   [facility], 2023; Successful PTCA/stent placement of a thrombotic lesion in the proximal LAD. Minimal disease in the circumflex and RCA.   Patient had VF arrest while in cath lab, 1 round of shock was given and he converted back to NSR.   Echocardiogram, 2023; EF 45-50%. Normal valvular structure and function.   Hypertension  Hyperlipidemia   Last LDL, 177, 2023  Thrombocytopenia  Psoriasis   ETOH    Allergies   Allergen Reactions    Penicillins Unknown - High Severity    Sulfa Antibiotics Unknown - High Severity    Cephalexin Rash       Current Medications:    Current Outpatient Medications:     allopurinol (ZYLOPRIM) 100 MG tablet, TAKE 1 TABLET BY MOUTH DAILY, DO NOT START UNTIL WE CALL WITH RESULTS, Disp: , Rfl:     aspirin 81 MG EC tablet, Take 1 tablet by mouth Daily., Disp: 30 tablet, Rfl: 11    bisoprolol (ZEBeta) 10 MG tablet, Take 1 tablet by mouth Daily., Disp: 30 tablet, Rfl: 11    clopidogrel (PLAVIX) 75 MG tablet, Take 1 tablet by mouth Daily., Disp: 30 tablet, Rfl: 11    rosuvastatin (CRESTOR) 20 MG tablet, Take 1 tablet by mouth Every Night., Disp: 90 tablet, Rfl: 3    ustekinumab (Stelara) 45 MG/0.5ML injection, Inject 45 mg under the skin into the appropriate area as directed Every 3 (Three) Months., Disp: , Rfl:     HPI    Santosh Medellin is a 62 y.o. male who presents today for a hospital follow up s/p [facility] with a history of coronary artery disease and cardiac risk factors. Since last visit, patient notes that he has been  "doing well this week. He notes last week he was really fatigued. He does go to cardio rehab three days a week. Patient does monitor their blood pressure with readings ranging around 120 mmHg systolic. His heart rate runs in the 90's with exertion. He states the day of his STEMI, he felt tired that am and wanted to nap.  Then a couple of hours later he began to dry heave and had chest pain which prompted an ED visit.     He asks about how long he will be on Plavix 75 mg and other medications. Patient notes that he had surgery on 03/10/2023 and his blood pressure was 180's mmHg systolic. Patient denies chest pain, shortness of breath, orthopnea, palpitations, edema, dizziness, and syncope.      The following portions of the patient's history were reviewed and updated as appropriate: allergies, current medications and problem list.    Pertinent positives as listed in the HPI.  All other systems reviewed are negative.         Vitals:    06/07/23 1314   BP: 122/78   BP Location: Left arm   Patient Position: Sitting   Cuff Size: Adult   Pulse: 82   SpO2: 94%   Weight: 84.5 kg (186 lb 3.2 oz)   Height: 182.9 cm (72\")       Physical Exam:  General: Alert and oriented.  Neck: Jugular venous pressure is within normal limits. Carotids have normal upstrokes without bruits.   Cardiovascular: Heart has a nondisplaced focal PMI. Regular rate and rhythm. No murmur, gallop or rub.  Lungs: Clear, no rales or wheezes. Equal expansion is noted.   Extremities: Show no edema.  Skin: Warm and dry.  Neurologic: Nonfocal.     Diagnostic Data (reviewed with patient):  Lab Results   Component Value Date    GLUCOSE 124 (H) 04/12/2023    BUN 12 04/12/2023    CREATININE 0.80 04/12/2023    EGFR 101.3 04/12/2023    BCR 15.0 04/12/2023     04/12/2023    K 4.2 04/12/2023     04/12/2023    CO2 20.0 (L) 04/12/2023    CALCIUM 8.8 04/12/2023    ALBUMIN 4.0 04/11/2023    ALKPHOS 65 04/11/2023     (H) 04/11/2023    ALT 61 (H) 04/11/2023 "     Lab Results   Component Value Date    CHOL 267 (H) 04/12/2023    TRIG 164 (H) 04/12/2023    HDL 60 04/12/2023     (H) 04/12/2023      Lab Results   Component Value Date    WBC 7.36 04/12/2023    RBC 4.45 04/12/2023    HGB 15.2 04/12/2023    HCT 43.5 04/12/2023    MCV 97.8 (H) 04/12/2023     (L) 04/12/2023          Procedures      Assessment:    ICD-10-CM ICD-9-CM   1. Coronary artery disease involving native coronary artery of native heart without angina pectoris  I25.10 414.01   2. Essential hypertension  I10 401.9   3. Other hyperlipidemia  E78.49 272.4         Plan:  Stable cardiac status.   Limited echocardiogram in one month ordered to reassess heart function.   Routine FLP and CMP one month to be completed by his dermatologist to reassess lipid levels.    Continue aspirin 81 mg and Plavix 75 mg daily for DAPT. Discussed with patient to stay on Plavix 75 mg for at least 6 months to one year.   Continue on bisoprolol 10 mg daily for hypertension.    Continue on rosuvastatin 20 mg nightly for hyperlipidemia.    Continue all other current medications.   F/up in 6 months, sooner if needed.      Scribed for Magy Francis MD by Francheska Abreu. 6/7/2023 13:23 EDT       I Magy Francis MD personally performed the services described in this documentation as scribed by the above individual in my presence, and it is both accurate and complete.    Magy Francis MD, FACC

## 2023-06-09 ENCOUNTER — TREATMENT (OUTPATIENT)
Dept: CARDIAC REHAB | Facility: HOSPITAL | Age: 63
End: 2023-06-09
Payer: COMMERCIAL

## 2023-06-09 DIAGNOSIS — I21.3 ST ELEVATION MYOCARDIAL INFARCTION (STEMI), UNSPECIFIED ARTERY: Primary | ICD-10-CM

## 2023-06-09 PROCEDURE — 93798 PHYS/QHP OP CAR RHAB W/ECG: CPT

## 2023-06-12 ENCOUNTER — TREATMENT (OUTPATIENT)
Dept: CARDIAC REHAB | Facility: HOSPITAL | Age: 63
End: 2023-06-12
Payer: COMMERCIAL

## 2023-06-12 DIAGNOSIS — I21.3 ST ELEVATION MYOCARDIAL INFARCTION (STEMI), UNSPECIFIED ARTERY: Primary | ICD-10-CM

## 2023-06-12 PROCEDURE — 93798 PHYS/QHP OP CAR RHAB W/ECG: CPT

## 2023-06-14 ENCOUNTER — TREATMENT (OUTPATIENT)
Dept: CARDIAC REHAB | Facility: HOSPITAL | Age: 63
End: 2023-06-14
Payer: COMMERCIAL

## 2023-06-14 DIAGNOSIS — I21.3 ST ELEVATION MYOCARDIAL INFARCTION (STEMI), UNSPECIFIED ARTERY: Primary | ICD-10-CM

## 2023-06-14 PROCEDURE — 93798 PHYS/QHP OP CAR RHAB W/ECG: CPT

## 2023-06-16 ENCOUNTER — TREATMENT (OUTPATIENT)
Dept: CARDIAC REHAB | Facility: HOSPITAL | Age: 63
End: 2023-06-16
Payer: COMMERCIAL

## 2023-06-16 DIAGNOSIS — I21.3 ST ELEVATION MYOCARDIAL INFARCTION (STEMI), UNSPECIFIED ARTERY: Primary | ICD-10-CM

## 2023-06-16 PROCEDURE — 93798 PHYS/QHP OP CAR RHAB W/ECG: CPT

## 2023-06-19 ENCOUNTER — TREATMENT (OUTPATIENT)
Dept: CARDIAC REHAB | Facility: HOSPITAL | Age: 63
End: 2023-06-19
Payer: COMMERCIAL

## 2023-06-19 DIAGNOSIS — I21.3 ST ELEVATION MYOCARDIAL INFARCTION (STEMI), UNSPECIFIED ARTERY: Primary | ICD-10-CM

## 2023-06-19 PROCEDURE — 93798 PHYS/QHP OP CAR RHAB W/ECG: CPT

## 2023-07-24 ENCOUNTER — APPOINTMENT (OUTPATIENT)
Dept: CARDIAC REHAB | Facility: HOSPITAL | Age: 63
End: 2023-07-24
Payer: COMMERCIAL

## 2023-07-26 ENCOUNTER — APPOINTMENT (OUTPATIENT)
Dept: CARDIAC REHAB | Facility: HOSPITAL | Age: 63
End: 2023-07-26
Payer: COMMERCIAL

## 2023-07-28 ENCOUNTER — APPOINTMENT (OUTPATIENT)
Dept: CARDIAC REHAB | Facility: HOSPITAL | Age: 63
End: 2023-07-28
Payer: COMMERCIAL

## 2023-12-07 ENCOUNTER — OFFICE VISIT (OUTPATIENT)
Dept: CARDIOLOGY | Facility: CLINIC | Age: 63
End: 2023-12-07
Payer: COMMERCIAL

## 2023-12-07 VITALS
BODY MASS INDEX: 25.57 KG/M2 | SYSTOLIC BLOOD PRESSURE: 142 MMHG | DIASTOLIC BLOOD PRESSURE: 86 MMHG | HEIGHT: 72 IN | OXYGEN SATURATION: 96 % | HEART RATE: 83 BPM | WEIGHT: 188.8 LBS

## 2023-12-07 DIAGNOSIS — I10 ESSENTIAL HYPERTENSION: ICD-10-CM

## 2023-12-07 DIAGNOSIS — I25.10 CORONARY ARTERY DISEASE INVOLVING NATIVE CORONARY ARTERY OF NATIVE HEART WITHOUT ANGINA PECTORIS: Primary | ICD-10-CM

## 2023-12-07 DIAGNOSIS — E78.49 OTHER HYPERLIPIDEMIA: ICD-10-CM

## 2023-12-07 PROCEDURE — 99214 OFFICE O/P EST MOD 30 MIN: CPT | Performed by: INTERNAL MEDICINE

## 2023-12-07 RX ORDER — ROSUVASTATIN CALCIUM 40 MG/1
40 TABLET, COATED ORAL NIGHTLY
Qty: 90 TABLET | Refills: 3 | Status: SHIPPED | OUTPATIENT
Start: 2023-12-07

## 2023-12-07 NOTE — PROGRESS NOTES
[facility] Cardiology    Patient ID: Santosh Medellin is a 63 y.o. male.  : 1960   Contact: [phone]    Encounter date: 2023    PCP: Deborah Almendarez APRN      Chief complaint:   Chief Complaint   Patient presents with    Coronary artery disease involving native coronary artery of     6-Mo F/U       Problem List:  Coronary artery disease  Acute STEMI due to occlusion of mid portion of left anterior descending (LAD) coronary artery   [facility], 2023; Successful PTCA/stent placement of a thrombotic lesion in the proximal LAD. Minimal disease in the circumflex and RCA.         Patient had VF arrest while in cath lab, 1 round of shock was given and he converted back to NSR.   Echocardiogram, 2023; EF 45-50%. Normal valvular structure and function.   Echo, 07/10/2023: EF 65%.  Hypertension  Hyperlipidemia   Last LDL, 177, 2023  Thrombocytopenia  Psoriasis   ETOH    Allergies   Allergen Reactions    Penicillins Unknown - High Severity    Sulfa Antibiotics Unknown - High Severity    Cephalexin Rash       Current Medications:    Current Outpatient Medications:     allopurinol (ZYLOPRIM) 100 MG tablet, TAKE 1 TABLET BY MOUTH DAILY, DO NOT START UNTIL WE CALL WITH RESULTS, Disp: , Rfl:     aspirin 81 MG EC tablet, Take 1 tablet by mouth Daily., Disp: 30 tablet, Rfl: 11    bisoprolol (ZEBeta) 10 MG tablet, Take 1 tablet by mouth Daily., Disp: 30 tablet, Rfl: 11    clopidogrel (PLAVIX) 75 MG tablet, Take 1 tablet by mouth Daily., Disp: 30 tablet, Rfl: 11    rosuvastatin (CRESTOR) 20 MG tablet, Take 1 tablet by mouth Every Night., Disp: 90 tablet, Rfl: 3    ustekinumab (Stelara) 45 MG/0.5ML injection, Inject 0.5 mL under the skin into the appropriate area as directed Every 3 (Three) Months., Disp: , Rfl:     HPI    Santosh Medellin is a 63 y.o. male who presents today for a 6 month follow up of CAD and cardiac risk factors. Since last visit, patient has  "been doing well overall from a cardiovascular standpoint. Patient stays busy and active by working out daily. He monitors his blood pressure regularly at home and notes that it typically measures between 107 mmHg and 115 mmHg systolic. Patient has noticed his blood pressure is higher on the days that he misses his exercise reaching as high as 167/95 mmHg. He notes that he bruises and bleeds easily from his Plavix. Patient denies chest pain, shortness of breath, orthopnea, palpitations, edema, dizziness, and syncope.       The following portions of the patient's history were reviewed and updated as appropriate: allergies, current medications and problem list.    Pertinent positives as listed in the HPI.  All other systems reviewed are negative.         Vitals:    12/07/23 1556   BP: 142/86   BP Location: Left arm   Patient Position: Sitting   Cuff Size: Adult   Pulse: 83   SpO2: 96%   Weight: 85.6 kg (188 lb 12.8 oz)   Height: 182.9 cm (72\")       Physical Exam:  General: Alert and oriented.  Neck: Jugular venous pressure is within normal limits. Carotids have normal upstrokes without bruits.   Cardiovascular: Heart has a nondisplaced focal PMI. Regular rate and rhythm. No murmur, gallop or rub.  Lungs: Clear, no rales or wheezes. Equal expansion is noted.   Extremities: Show no edema.  Skin: Warm and dry.  Neurologic: Nonfocal.     Diagnostic Data (reviewed with patient):  Lab date: 11/01/2023  FLP: , , HDL 60, LDL 99   CMP: Glu 108, BUN 9, Creat 1, eGFR >60, Na 144, K 3.8, Cl 104, CO2 25, Ca 9.4, Alk Phos 127, , ALT 42  CBC: WBC 5.7, RBC 3.86, HGB 13.3, HCT 39.4, , MCH 34.5,   HbA1c: 5.9 (08/09/2023)             Procedures      Assessment:    ICD-10-CM ICD-9-CM   1. Coronary artery disease involving native coronary artery of native heart without angina pectoris  I25.10 414.01   2. Essential hypertension  I10 401.9   3. Other hyperlipidemia  E78.49 272.4         Plan:  Patient was " encouraged to continue to be active and have a healthy diet.  Request routine labs from PCP at next visit.  He will be seeing her in 4 months and will have his lipids repeated then.  Increase rosuvastatin to 40 mg daily with a goal of LDL <70.   Continue on aspirin 81 mg for antiplatelet therapy, indefinitely  Continue on bisoprolol 10 mg daily for rate control and hypertension.   Continue on Plavix 75 mg daily for antiplatelet therapy until mid April 2024  Continue all other current medications.  F/up in 12 months, sooner if needed.      Scribed for Magy Francis MD by Kareem Chambers. 12/7/2023 16:05 EST    I Magy Francis MD personally performed the services described in this documentation as scribed by the above individual in my presence, and it is both accurate and complete.    Magy Francis MD, FACC

## 2023-12-28 NOTE — PROGRESS NOTES
Attended Phase II Cardiac Rehab. No medication or health history changes reported. See [facility] for details.  
diarrhea

## 2024-01-30 RX ORDER — BISOPROLOL FUMARATE 10 MG/1
10 TABLET, FILM COATED ORAL
Qty: 30 TABLET | Refills: 11 | Status: SHIPPED | OUTPATIENT
Start: 2024-01-30

## 2024-03-03 RX ORDER — BISOPROLOL FUMARATE 10 MG/1
10 TABLET, FILM COATED ORAL
Qty: 90 TABLET | Refills: 3 | Status: SHIPPED | OUTPATIENT
Start: 2024-03-03

## 2024-03-04 RX ORDER — ASPIRIN 81 MG/1
81 TABLET ORAL DAILY
Qty: 90 TABLET | Refills: 3 | Status: SHIPPED | OUTPATIENT
Start: 2024-03-04

## 2024-03-04 RX ORDER — CLOPIDOGREL BISULFATE 75 MG/1
75 TABLET ORAL DAILY
Qty: 90 TABLET | Refills: 3 | Status: SHIPPED | OUTPATIENT
Start: 2024-03-04

## 2024-10-24 ENCOUNTER — CLINICAL SUPPORT (OUTPATIENT)
Dept: CARDIOLOGY | Facility: CLINIC | Age: 64
End: 2024-10-24
Payer: COMMERCIAL

## 2024-10-24 DIAGNOSIS — I21.02 ACUTE ST ELEVATION MYOCARDIAL INFARCTION (STEMI) DUE TO OCCLUSION OF MID PORTION OF LEFT ANTERIOR DESCENDING (LAD) CORONARY ARTERY: Primary | ICD-10-CM

## 2024-10-24 DIAGNOSIS — I21.3 ST ELEVATION MYOCARDIAL INFARCTION (STEMI), UNSPECIFIED ARTERY: Primary | ICD-10-CM

## 2024-11-21 DIAGNOSIS — I21.3 ST ELEVATION MYOCARDIAL INFARCTION (STEMI), UNSPECIFIED ARTERY: Primary | ICD-10-CM

## 2024-11-21 DIAGNOSIS — I49.01 VENTRICULAR FIBRILLATION: ICD-10-CM

## 2024-11-21 DIAGNOSIS — E78.5 HYPERLIPIDEMIA LDL GOAL <70: ICD-10-CM

## 2024-11-21 RX ORDER — ROSUVASTATIN CALCIUM 40 MG/1
40 TABLET, COATED ORAL
Qty: 30 TABLET | Refills: 0 | Status: SHIPPED | OUTPATIENT
Start: 2024-11-21

## 2024-12-09 NOTE — PROGRESS NOTES
[facility] Cardiology    Patient ID: Santsoh Medellin is a 64 y.o. male.  : 1960   Contact: [phone]    Encounter date: 2024    PCP: Deborah Almendarez APRN      Chief complaint:   Chief Complaint   Patient presents with    Coronary Artery Disease       Problem List:  Coronary artery disease  Acute STEMI due to occlusion of mid portion of left anterior descending (LAD) coronary artery   [facility], 2023; Successful PTCA/stent placement of a thrombotic lesion in the proximal LAD. Minimal disease in the circumflex and RCA.         Patient had VF arrest while in cath lab, 1 round of shock was given and he converted back to NSR.   Echocardiogram, 2023; EF 45-50%. Normal valvular structure and function.   Echo, 07/10/2023: EF 65%.  Hypertension  Hyperlipidemia   Last LDL, 177, 2023  Thrombocytopenia  Psoriasis   ETOH    Allergies   Allergen Reactions    Penicillins Unknown - High Severity    Sulfa Antibiotics Unknown - High Severity    Cephalexin Rash       Current Medications:    Current Outpatient Medications:     aspirin 81 MG EC tablet, Take 1 tablet by mouth Daily., Disp: 90 tablet, Rfl: 3    bisoprolol (ZEBeta) 10 MG tablet, Take 1 tablet by mouth Daily., Disp: 90 tablet, Rfl: 3    rosuvastatin (CRESTOR) 40 MG tablet, TAKE 1 TABLET BY MOUTH EVERY DAY AT NIGHT, Disp: 30 tablet, Rfl: 0    ustekinumab (Stelara) 45 MG/0.5ML injection, Inject 0.5 mL under the skin into the appropriate area as directed Every 3 (Three) Months., Disp: , Rfl:     allopurinol (ZYLOPRIM) 100 MG tablet, TAKE 1 TABLET BY MOUTH DAILY, DO NOT START UNTIL WE CALL WITH RESULTS, Disp: , Rfl:     clopidogrel (PLAVIX) 75 MG tablet, Take 1 tablet by mouth Daily., Disp: 90 tablet, Rfl: 3    HPI    Santosh Medellin is a 64 y.o. male who presents today for a 12 month follow up of coronary artery disease and cardiac risk factors. Since last visit, Santosh has done well.  He is walking  "1.5 miles a day 4 days a week and rides his stationary bike some.  He denies chest pain and shortness of breath.  He would like to get off the beta-blocker if possible.      The following portions of the patient's history were reviewed and updated as appropriate: allergies, current medications and problem list.    Pertinent positives as listed in the HPI.  All other systems reviewed are negative.         Vitals:    12/11/24 1558   BP: 108/72   BP Location: Left arm   Patient Position: Sitting   Pulse: 83   SpO2: 97%   Weight: 83.9 kg (185 lb)   Height: 182.9 cm (72\")       Physical Exam:  General: Alert and oriented.  Neck: Jugular venous pressure is within normal limits. Carotids have normal upstrokes without bruits.   Cardiovascular: Heart has a nondisplaced focal PMI. Regular rate and rhythm. No murmur, gallop or rub.  Lungs: Clear, no rales or wheezes. Equal expansion is noted.   Extremities: Show no edema.  Skin: Warm and dry.  Neurologic: Nonfocal.     Diagnostic Data (reviewed with patient):    Lab date: 11/25/2024  FLP: , , , LDL 99  CMP: Glu 121, BUN 16, Creat 1.28, eGFR 63, Na 145, K 4.5, Cl 106, CO2 26, Ca 9.8, Alk Phos 84, AST 77, ALT 63  CBC: WBC 5.4, RBC 4.44, HGB 14.7, HCT 43.9, MCV 99, MCH 33.1,   TSH: 4.12     Procedures      Assessment:    ICD-10-CM ICD-9-CM   1. Coronary artery disease involving native coronary artery of native heart without angina pectoris  I25.10 414.01   2. Essential hypertension  I10 401.9   3. Hyperlipidemia LDL goal <70  E78.5 272.4         Plan:  Try weaning off bisoprolol by cutting it in half for 2 weeks and then 25 mg every other day for 2 weeks and then discontinue.  He understands that his heart rate will increase somewhat but will gradually come down over a few months.  Continue on aspirin 81 mg daily for antiplatelet therapy.    Continue on rosuvastatin 40 mg nightly for hyperlipidemia.    Continue all other current medications.  F/up in 12 " months, sooner if needed.      Magy Francis MD, FACC

## 2024-12-11 ENCOUNTER — OFFICE VISIT (OUTPATIENT)
Dept: CARDIOLOGY | Facility: CLINIC | Age: 64
End: 2024-12-11
Payer: COMMERCIAL

## 2024-12-11 VITALS
WEIGHT: 185 LBS | HEART RATE: 83 BPM | BODY MASS INDEX: 25.06 KG/M2 | HEIGHT: 72 IN | OXYGEN SATURATION: 97 % | SYSTOLIC BLOOD PRESSURE: 108 MMHG | DIASTOLIC BLOOD PRESSURE: 72 MMHG

## 2024-12-11 DIAGNOSIS — I25.10 CORONARY ARTERY DISEASE INVOLVING NATIVE CORONARY ARTERY OF NATIVE HEART WITHOUT ANGINA PECTORIS: Primary | ICD-10-CM

## 2024-12-11 DIAGNOSIS — I10 ESSENTIAL HYPERTENSION: ICD-10-CM

## 2024-12-11 DIAGNOSIS — E78.5 HYPERLIPIDEMIA LDL GOAL <70: ICD-10-CM

## 2024-12-11 PROCEDURE — 99214 OFFICE O/P EST MOD 30 MIN: CPT | Performed by: INTERNAL MEDICINE

## 2024-12-16 NOTE — TELEPHONE ENCOUNTER
11/25/24  Total Protein 7.5  Albumin 4.9  Globulin 2.6  A/G Ratio 1.9  Alkaline Phos 84  ALT  63  AST  77  Total Bili 0.6    Chol  214  Trigs  142  HDL  105  LDL  28

## 2024-12-18 RX ORDER — ROSUVASTATIN CALCIUM 40 MG/1
40 TABLET, COATED ORAL
Qty: 90 TABLET | Refills: 1 | Status: SHIPPED | OUTPATIENT
Start: 2024-12-18

## 2025-01-07 ENCOUNTER — TELEPHONE (OUTPATIENT)
Dept: CARDIOLOGY | Facility: CLINIC | Age: 65
End: 2025-01-07
Payer: COMMERCIAL

## 2025-01-07 DIAGNOSIS — R79.89 ELEVATED LIVER FUNCTION TESTS: ICD-10-CM

## 2025-01-07 DIAGNOSIS — F10.10 ALCOHOL ABUSE: Primary | ICD-10-CM

## 2025-01-07 NOTE — TELEPHONE ENCOUNTER
Spoke with patient.  He is instructed of need for repeat liver function test in two weeks.  He verbalizes understanding.

## 2025-01-07 NOTE — TELEPHONE ENCOUNTER
----- Message from Magy Francis sent at 1/6/2025  2:41 PM EST -----  Please recheck LFTs in 2 weeks.  AST was 77. ALT was 63.  They have been high in past.  ----- Message -----  From: Sonali Nicole  Sent: 12/2/2024  12:45 PM EST  To: Magy Francis MD

## 2025-03-07 RX ORDER — BISOPROLOL FUMARATE 10 MG/1
10 TABLET, FILM COATED ORAL
Qty: 90 TABLET | Refills: 0 | Status: SHIPPED | OUTPATIENT
Start: 2025-03-07

## 2025-03-20 RX ORDER — ASPIRIN 81 MG/1
81 TABLET ORAL DAILY
Qty: 90 TABLET | Refills: 3 | Status: SHIPPED | OUTPATIENT
Start: 2025-03-20

## 2025-06-02 RX ORDER — BISOPROLOL FUMARATE 10 MG/1
10 TABLET, FILM COATED ORAL
Qty: 90 TABLET | Refills: 0 | Status: SHIPPED | OUTPATIENT
Start: 2025-06-02

## 2025-06-13 ENCOUNTER — TREATMENT (OUTPATIENT)
Dept: PHYSICAL THERAPY | Facility: CLINIC | Age: 65
End: 2025-06-13
Payer: COMMERCIAL

## 2025-06-13 DIAGNOSIS — M24.541 CONTRACTURE OF FINGER JOINT, RIGHT: Primary | ICD-10-CM

## 2025-06-13 PROCEDURE — L3925 FO PIP DIP JNT/SPRNG PRE OTS: HCPCS | Performed by: PHYSICAL THERAPIST

## 2025-06-13 NOTE — PROGRESS NOTES
Santosh Medellin 1960   Diagnosis/ Surgery: ***              Date Of Injury: ***    Date Of Surgery:***    Hand Dominance: ***  History of Present Condition: ***  Medical/Vocational History/ Medications: ***    Pain: ***    Edema: ***  Sensibility: WNL   Wound Status:***  ROM/ Strength: ***      Goals:  Patient is independent with HEP.   Plan:  Patient HEP includes ***.   No additional treatment is required for this patient at this time. The patient is therefore discharged from therapy.  Patient advised to contact therapist with any additional questions or concerns regarding the HEP and/or instructions given.           PT SIGNATURE: Lina Merino, PT   DATE TREATMENT INITIATED: 6/13/2025    {Certification Type:[id number]}  Certification Period: 9/11/2025  I certify that the therapy services are furnished while this patient is under my care.  The services outlined above are required by this patient, and will be reviewed every 90 days.     PHYSICIAN:       DATE:     Please sign and return via fax to [phone].. Thank you, [facility].

## 2025-06-13 NOTE — PROGRESS NOTES
Santosh Medellin 1960   Diagnosis/ Surgery: right ring PIPJ contracture                                                                                                                     Date Of Injury: 5 years                                    Date Of Surgery:NA     Hand Dominance: right   History of Present Condition: progressive contracture.   Medical/Vocational History/ Medications: retired      Pain: 0/10                                            Edema: none   Sensibility: WNL   Wound Status:NA  ROM/ Strength: 20 deg from full ring finger PIPJ extension    Splinting:  Patient was measure and fit with a prefabricated joint jack (medium)  Patient was instructed in wearing schedule, precautions and care of the splint during this visit.   Patient was instructed in proper donning/doffing of splint.   Assessment:  Patient was fitted and appropriate splint was fabricated this date.  Patient reported that splint was comfortable and had no complications with the fit of the splint.  Patient was instructed and patient verbalized understanding of precautions, wear and care of the splint.   Patient demonstrated independent donning/doffing of splint during treatment today.  Goals:  Patient was fitted properly with appropriate splint for diagnosis  Patient was educated on precautions, wear schedule and care of splint  Patient demonstrated independence with donning/doffing of the splint.  Splint was provided to Protect Healing Structures, Restrict Mobility, Improve joint alignment.  Plan:  No additional treatment is required for this patient at this time. The patient is therefore discharged from therapy.  Patient advised to contact therapist with any additional questions or concerns regarding the fit and function of the splint.  Patient will be seen for splint issues as needed   Wear Instructions: night use or at least 30 min 2-3 times per day          PT SIGNATURE: Lina Merino, PT   DATE TREATMENT INITIATED:  6/13/2025    Initial Certification  Certification Period: 9/11/2025  I certify that the therapy services are furnished while this patient is under my care.  The services outlined above are required by this patient, and will be reviewed every 90 days.     PHYSICIAN:       DATE:     Please sign and return via fax to [phone].. Thank you, [facility].

## 2025-08-28 RX ORDER — BISOPROLOL FUMARATE 10 MG/1
10 TABLET, FILM COATED ORAL
Qty: 90 TABLET | Refills: 1 | Status: SHIPPED | OUTPATIENT
Start: 2025-08-28

## (undated) DEVICE — DEV INFL MONARCH 25W

## (undated) DEVICE — ST EXT IV SMRTSTE 2VLV FIX M LL 6ML 41

## (undated) DEVICE — KT MANIFOLD CATHLAB CUST

## (undated) DEVICE — NC TREK NEO™ CORONARY DILATATION CATHETER 3.00 MM X 12 MM / RAPID-EXCHANGE: Brand: NC TREK NEO™

## (undated) DEVICE — GW PERIPH VASC ADX J/TP SS .035 150CM 3MM

## (undated) DEVICE — MINI TREK CORONARY DILATATION CATHETER 2.0 MM X 12 MM / RAPID-EXCHANGE: Brand: MINI TREK

## (undated) DEVICE — CATH DIAG EXPO M/ PK 6FR FL4/FR4 PIG 3PK

## (undated) DEVICE — GUIDE CATHETER: Brand: MACH1™

## (undated) DEVICE — PK CATH CARD 10

## (undated) DEVICE — ADULT, W/LG. BACK PAD, RADIOTRANSPARENT ELEMENT AND LEAD WIRE: Brand: DEFIBRILLATION ELECTRODES

## (undated) DEVICE — GW LUGE .014 182 CM

## (undated) DEVICE — PINNACLE INTRODUCER SHEATH: Brand: PINNACLE

## (undated) DEVICE — PRESSURE MONITORING SET: Brand: TRUWAVE, VAMP

## (undated) DEVICE — ST INF PRI SMRTSTE 20DRP 2VLV 24ML 117